# Patient Record
Sex: MALE | Race: WHITE | NOT HISPANIC OR LATINO | Employment: OTHER | URBAN - METROPOLITAN AREA
[De-identification: names, ages, dates, MRNs, and addresses within clinical notes are randomized per-mention and may not be internally consistent; named-entity substitution may affect disease eponyms.]

---

## 2017-01-03 DIAGNOSIS — I10 ESSENTIAL HYPERTENSION, BENIGN: Primary | ICD-10-CM

## 2017-01-03 NOTE — TELEPHONE ENCOUNTER
amLODIPine (NORVASC) 5 MG tablet      Last Written Prescription Date: 01/14/2016  Last Fill Quantity: 90, # refills: 3    [place] [facility] or [facility] prescribing provider:  01/14/2016 Fab[place]   Future Office Visit:    Next 5 appointments (look out 90 days)     Jan 17, 2017  8:45 AM   PHYSICAL with Sylvester Sequeira MD   [facility] ([facility])    [address] [place] [address]   [phone]                    BP Readings from Last 3 Encounters:   01/14/16 138/78   01/12/15 140/80   07/24/13 126/87       lisinopril (PRINIVIL,ZESTRIL) 20 MG tablet      Last Written Prescription Date: 01/14/2016  Last Fill Quantity: 90, # refills: 3  Last [place] [facility] or [facility] prescribing provider: 01/14/2016 Fabby   Next 5 appointments (look out 90 days)     Jan 17, 2017  8:45 AM   PHYSICAL with Sylvester Sequeira MD   [facility] ([facility])    [address] [facility] [address]   [phone]                   POTASSIUM   Date Value Ref Range Status   01/14/2016 4.3 3.4 - 5.3 mmol/L Final     CREATININE   Date Value Ref Range Status   01/14/2016 0.79 0.66 - 1.25 mg/dL Final     BP Readings from Last 3 Encounters:   01/14/16 138/78   01/12/15 140/80   07/24/13 126/87

## 2017-01-04 DIAGNOSIS — I10 ESSENTIAL HYPERTENSION, BENIGN: Primary | ICD-10-CM

## 2017-01-04 NOTE — TELEPHONE ENCOUNTER
Lisinopril 20mg tab      Last Written Prescription Date: 01/14/16  Last Fill Quantity: 90, # refills: 3  Last Office Visit with [facility] or [facility] prescribing provider: 01/14/16   Next 5 appointments (look out 90 days)     Jan 17, 2017  8:45 AM   PHYSICAL with Sylvester Sequeira MD   [facility] ([facility])    [address] [place] [address] [phone]                   POTASSIUM   Date Value Ref Range Status   01/14/2016 4.3 3.4 - 5.3 mmol/L Final     CREATININE   Date Value Ref Range Status   01/14/2016 0.79 0.66 - 1.25 mg/dL Final     BP Readings from Last 3 Encounters:   01/14/16 138/78   01/12/15 140/80   07/24/13 126/87

## 2017-01-05 RX ORDER — AMLODIPINE BESYLATE 5 MG/1
5 TABLET ORAL DAILY
Qty: 90 TABLET | Refills: 0 | Status: SHIPPED | OUTPATIENT
Start: 2017-01-05 | End: 2017-03-27

## 2017-01-05 RX ORDER — LISINOPRIL 20 MG/1
20 TABLET ORAL DAILY
Qty: 90 TABLET | Refills: 0 | Status: SHIPPED | OUTPATIENT
Start: 2017-01-05 | End: 2017-03-07

## 2017-01-05 RX ORDER — LISINOPRIL 20 MG/1
20 TABLET ORAL DAILY
Qty: 90 TABLET | Refills: 0 | Status: SHIPPED | OUTPATIENT
Start: 2017-01-05 | End: 2017-08-01

## 2017-01-05 NOTE — TELEPHONE ENCOUNTER
One fill approved since due for visit, scheduled  Prescription approved per [facility] Refill Protocol  Joanna Alcala RN BS

## 2017-01-05 NOTE — TELEPHONE ENCOUNTER
One fill authorized as is due and scheduled for appt  Prescription approved per [facility] Refill Protocol  Joanna Alcala RN BS

## 2017-01-17 ENCOUNTER — OFFICE VISIT (OUTPATIENT)
Dept: FAMILY MEDICINE | Facility: CLINIC | Age: 72
End: 2017-01-17
Payer: MEDICARE

## 2017-01-17 VITALS
HEIGHT: 67 IN | TEMPERATURE: 97.8 F | DIASTOLIC BLOOD PRESSURE: 82 MMHG | SYSTOLIC BLOOD PRESSURE: 124 MMHG | WEIGHT: 136.6 LBS | RESPIRATION RATE: 10 BRPM | BODY MASS INDEX: 21.44 KG/M2 | HEART RATE: 64 BPM

## 2017-01-17 DIAGNOSIS — Z23 NEED FOR PROPHYLACTIC VACCINATION AND INOCULATION AGAINST INFLUENZA: Primary | ICD-10-CM

## 2017-01-17 DIAGNOSIS — Z13.6 CARDIOVASCULAR SCREENING; LDL GOAL LESS THAN 130: ICD-10-CM

## 2017-01-17 DIAGNOSIS — R97.20 ELEVATED PROSTATE SPECIFIC ANTIGEN (PSA): ICD-10-CM

## 2017-01-17 DIAGNOSIS — D23.5 BENIGN NEOPLASM OF SKIN OF TRUNK, EXCEPT SCROTUM: ICD-10-CM

## 2017-01-17 LAB
ALBUMIN SERPL-MCNC: 3.9 G/DL (ref 3.4–5)
ALP SERPL-CCNC: 84 U/L (ref 40–150)
ALT SERPL W P-5'-P-CCNC: 29 U/L (ref 0–70)
ANION GAP SERPL CALCULATED.3IONS-SCNC: 5 MMOL/L (ref 3–14)
AST SERPL W P-5'-P-CCNC: 25 U/L (ref 0–45)
BILIRUB SERPL-MCNC: 0.9 MG/DL (ref 0.2–1.3)
BUN SERPL-MCNC: 14 MG/DL (ref 7–30)
CALCIUM SERPL-MCNC: 9.4 MG/DL (ref 8.5–10.1)
CHLORIDE SERPL-SCNC: 103 MMOL/L (ref 94–109)
CHOLEST SERPL-MCNC: 195 MG/DL
CO2 SERPL-SCNC: 31 MMOL/L (ref 20–32)
CREAT SERPL-MCNC: 0.89 MG/DL (ref 0.66–1.25)
GFR SERPL CREATININE-BSD FRML MDRD: 85 ML/MIN/1.7M2
GLUCOSE SERPL-MCNC: 92 MG/DL (ref 70–99)
HDLC SERPL-MCNC: 53 MG/DL
LDLC SERPL CALC-MCNC: 125 MG/DL
NONHDLC SERPL-MCNC: 142 MG/DL
POTASSIUM SERPL-SCNC: 4 MMOL/L (ref 3.4–5.3)
PROT SERPL-MCNC: 7.4 G/DL (ref 6.8–8.8)
PSA SERPL-MCNC: 12.2 UG/L (ref 0–4)
SODIUM SERPL-SCNC: 139 MMOL/L (ref 133–144)
TRIGL SERPL-MCNC: 83 MG/DL

## 2017-01-17 PROCEDURE — 84153 ASSAY OF PSA TOTAL: CPT | Performed by: FAMILY MEDICINE

## 2017-01-17 PROCEDURE — G0439 PPPS, SUBSEQ VISIT: HCPCS | Performed by: FAMILY MEDICINE

## 2017-01-17 PROCEDURE — 90662 IIV NO PRSV INCREASED AG IM: CPT | Performed by: FAMILY MEDICINE

## 2017-01-17 PROCEDURE — 36415 COLL VENOUS BLD VENIPUNCTURE: CPT | Performed by: FAMILY MEDICINE

## 2017-01-17 PROCEDURE — 80061 LIPID PANEL: CPT | Performed by: FAMILY MEDICINE

## 2017-01-17 PROCEDURE — 80053 COMPREHEN METABOLIC PANEL: CPT | Performed by: FAMILY MEDICINE

## 2017-01-17 PROCEDURE — G0008 ADMIN INFLUENZA VIRUS VAC: HCPCS | Performed by: FAMILY MEDICINE

## 2017-01-17 NOTE — PATIENT INSTRUCTIONS
Preventive Health Recommendations:   Male Ages 65 and over    Yearly exam:             See your health care provider every year in order to  o   Review health changes.   o   Discuss preventive care.    o   Review your medicines if your doctor has prescribed any.    Talk with your health care provider about whether you should have a test to screen for prostate cancer (PSA).    Every 3 years, have a diabetes test (fasting glucose). If you are at risk for diabetes, you should have this test more often.    Every 5 years, have a cholesterol test. Have this test more often if you are at risk for high cholesterol or heart disease.     Every 10 years, have a colonoscopy. Or, have a yearly FIT test (stool test). These exams will check for colon cancer.    Talk to with your health care provider about screening for Abdominal Aortic Aneurysm if you have a family history of AAA or have a history of smoking.    Shots:     Get a flu shot each year.     Get a tetanus shot every 10 years.     Talk to your doctor about your pneumonia vaccines. There are now two you should receive - Pneumovax (PPSV 23) and Prevnar (PCV 13).     Talk to your doctor about a shingles vaccine.     Talk to your doctor about the hepatitis B vaccine.  Nutrition:     Eat at least 5 servings of fruits and vegetables each day.     Eat whole-grain bread, whole-wheat pasta and brown rice instead of white grains and rice.     Talk to your provider about Calcium and Vitamin D.   Lifestyle    Exercise for at least 150 minutes a week (30 minutes a day, 5 days a week). This will help you control your weight and prevent disease.     Limit alcohol to one drink per day.     No smoking.     Wear sunscreen to prevent skin cancer.     See your dentist every six months for an exam and cleaning.     See your eye doctor every 1 to 2 years to screen for conditions such as glaucoma, macular degeneration, cataracts, etc

## 2017-01-17 NOTE — MR AVS SNAPSHOT
After Visit Summary   1/17/2017    Paul Smith    MRN: [id number]           Patient Information     Date Of Birth          1945        Visit Information        Provider Department      1/17/2017 8:45 AM Sylvester Sequeira MD [facility]        Today's Diagnoses     Benign neoplasm of skin of trunk, except scrotum    -  1     Elevated prostate specific antigen (PSA)         CARDIOVASCULAR SCREENING; LDL GOAL LESS THAN 130         Elevated prostate specific antigen (PSA)            Care Instructions      Preventive Health Recommendations:   Male Ages 65 and over    Yearly exam:             See your health care provider every year in order to  o   Review health changes.   o   Discuss preventive care.    o   Review your medicines if your doctor has prescribed any.    Talk with your health care provider about whether you should have a test to screen for prostate cancer (PSA).    Every 3 years, have a diabetes test (fasting glucose). If you are at risk for diabetes, you should have this test more often.    Every 5 years, have a cholesterol test. Have this test more often if you are at risk for high cholesterol or heart disease.     Every 10 years, have a colonoscopy. Or, have a yearly FIT test (stool test). These exams will check for colon cancer.    Talk to with your health care provider about screening for Abdominal Aortic Aneurysm if you have a family history of AAA or have a history of smoking.    Shots:     Get a flu shot each year.     Get a tetanus shot every 10 years.     Talk to your doctor about your pneumonia vaccines. There are now two you should receive - Pneumovax (PPSV 23) and Prevnar (PCV 13).     Talk to your doctor about a shingles vaccine.     Talk to your doctor about the hepatitis B vaccine.  Nutrition:     Eat at least 5 servings of fruits and vegetables each day.     Eat whole-grain bread, whole-wheat pasta and brown rice instead of white grains and rice.      Talk to your provider about Calcium and Vitamin D.   Lifestyle    Exercise for at least 150 minutes a week (30 minutes a day, 5 days a week). This will help you control your weight and prevent disease.     Limit alcohol to one drink per day.     No smoking.     Wear sunscreen to prevent skin cancer.     See your dentist every six months for an exam and cleaning.     See your eye doctor every 1 to 2 years to screen for conditions such as glaucoma, macular degeneration, cataracts, etc         Follow-ups after your visit        Additional Services     DERMATOLOGY REFERRAL       Your provider has referred you to: [facility]: [facility] [phone]   [email or web address]    Please be aware that coverage of these services is subject to the terms and limitations of your health insurance plan.  Call member services at your health plan with any benefit or coverage questions.      Please bring the following with you to your appointment:    (1) Any X-Rays, CTs or MRIs which have been performed.  Contact the facility where they were done to arrange for  prior to your scheduled appointment.    (2) List of current medications  (3) This referral request   (4) Any documents/labs given to you for this referral                  Your next 10 appointments already scheduled     Jan 25, 2017  9:30 AM   Return Visit with Chaparro Patterson MD   [facility] ([facility])    [facility] [address]  [address]  [facility] [address] [phone]              Who to contact     If you have questions or need follow up information about today's clinic visit or your schedule please contact [facility] directly at [phone].  Normal or non-critical lab and imaging results will be communicated to you by MyChart, letter or phone within 4 business days after the clinic has received the results. If you do not hear from us  "within 7 days, please contact the clinic through [employer] or phone. If you have a critical or abnormal lab result, we will notify you by phone as soon as possible.  Submit refill requests through [employer] or call your pharmacy and they will forward the refill request to us. Please allow 3 business days for your refill to be completed.          Additional Information About Your Visit        [facility] Information     [employer] lets you send messages to your doctor, view your test results, renew your prescriptions, schedule appointments and more. To sign up, go to [email or web address] . Click on \"Log in\" on the left side of the screen, which will take you to the Welcome page. Then click on \"Sign up Now\" on the right side of the page.     You will be asked to enter the access code listed below, as well as some personal information. Please follow the directions to create your username and password.     Your access code is: [email or web address]  Expires: 2017  9:14 AM     Your access code will  in 90 days. If you need help or a new code, please call your [place] clinic or [phone].        Care EveryWhere ID     This is your Care EveryWhere ID. This could be used by other organizations to access your [place] medical records  [id number]        Your Vitals Were     Pulse Temperature Respirations Height BMI (Body Mass Index)       64 97.8  F (36.6  C) (Oral) 10 5' 7.25\" (1.708 m) 21.24 kg/m2        Blood Pressure from Last 3 Encounters:   17 124/82   16 138/78   01/12/15 140/80    Weight from Last 3 Encounters:   17 136 lb 9.6 oz (61.961 kg)   16 140 lb (63.504 kg)   01/12/15 144 lb (65.318 kg)              We Performed the Following     Comprehensive metabolic panel     DERMATOLOGY REFERRAL     Lipid Profile with reflex to direct LDL     PSA tumor marker        Primary Care Provider Office Phone # Fax #    Sylvester Sequeira [phone]       [facility]" [address]  [facility] [address]        Thank you!     Thank you for choosing [facility]  for your care. Our goal is always to provide you with excellent care. Hearing back from our patients is one way we can continue to improve our services. Please take a few minutes to complete the written survey that you may receive in the mail after your visit with us. Thank you!             Your Updated Medication List - Protect others around you: Learn how to safely use, store and throw away your medicines at www.disposemymeds.org.          This list is accurate as of: 1/17/17  9:14 AM.  Always use your most recent med list.                   Brand Name Dispense Instructions for use    amLODIPine 5 MG tablet    NORVASC    90 tablet    Take 1 tablet (5 mg) by mouth daily       ASPIRIN ADULT LOW STRENGTH PO      Take 81 mg by mouth daily.       * lisinopril 20 MG tablet    PRINIVIL/ZESTRIL    90 tablet    Take 1 tablet (20 mg) by mouth daily       * lisinopril 20 MG tablet    PRINIVIL/ZESTRIL    90 tablet    Take 1 tablet (20 mg) by mouth daily       * Notice:  This list has 2 medication(s) that are the same as other medications prescribed for you. Read the directions carefully, and ask your doctor or other care provider to review them with you.

## 2017-01-17 NOTE — PROGRESS NOTES
SUBJECTIVE:                                                            Paul Smith is a 71 year old male who presents for Preventive Visit.    Are you in the first 12 months of your Medicare Part B coverage?  yes    Healthy Habits:    Do you get at least three servings of calcium containing foods daily (dairy, green leafy vegetables, etc.)? yes    Amount of exercise or daily activities, outside of work: 20 minutes a day    Problems taking medications regularly No    Medication side effects: No    Have you had an eye exam in the past two years? yes    Do you see a dentist twice per year? yes    Do you have sleep apnea, excessive snoring or daytime drowsiness?no    COGNITIVE SCREENING:  Not appropriate due to known dementia            All Histories reviewed and updated in EPIC as appropriate.  Social History   Substance Use Topics     Smoking status: Never Smoker      Smokeless tobacco: Never Used     Alcohol Use: Yes      Comment: beer or wine socially occasionally       none    Today's PHQ-2 Score:   PHQ-2 ( 1999 [employer]) 1/14/2016 1/12/2015   Q1: Little interest or pleasure in doing things 0 0   Q2: Feeling down, depressed or hopeless 0 0   PHQ-2 Score 0 0       Do you feel safe in your environment - Yes    Do you have a Health Care Directive?: Yes: Patient states has Advance Directive and will bring in a copy to clinic.  HE WILL BE BRINGING IN HIS HEALTH CARE DIRECTIVE   Current providers sharing in care for this patient include:   Patient Care Team:  Sylvester Sequeira MD as PCP - General (Family Practice)      Hearing impairment:     Ability to successfully perform activities of daily living:      Fall risk:      Home safety:  none identified  click delete button to remove this line now    The following health maintenance items are reviewed in Epic and correct as of today:  Health Maintenance   Topic Date Due     ADVANCE DIRECTIVE PLANNING Q5 YRS (NO INBASKET)  06/20/1963     HEPATITIS C SCREENING   "06/20/1963     AORTIC ANEURYSM SCREENING (SYSTEM ASSIGNED)  06/20/2011     INFLUENZA VACCINE (SYSTEM ASSIGNED)  09/01/2016     CREATININE Q1 YEAR (NO INBASKET)  01/14/2017     MEDICARE ANNUAL WELLNESS VISIT  01/14/2017     FALL RISK ASSESSMENT  01/14/2017     LIPID SCREEN Q5 YR MALE (SYSTEM ASSIGNED)  01/14/2021     TETANUS IMMUNIZATION (SYSTEM ASSIGNED)  02/01/2021     COLON CANCER SCREEN (SYSTEM ASSIGNED)  07/24/2023     PNEUMOCOCCAL  Completed         Pneumonia Vaccine:Adults age 65+ who received Pneumovax (PPSV23) at 65 years or older: Should be given PCV13 > 1 year after their most recent PPSV23     ROS:  Constitutional, HEENT, cardiovascular, pulmonary, GI, , musculoskeletal, neuro, skin, endocrine and psych systems are negative, except as otherwise noted.    BP Readings from Last 3 Encounters:   01/17/17 124/82   01/14/16 138/78   01/12/15 140/80    Wt Readings from Last 3 Encounters:   01/17/17 136 lb 9.6 oz (61.961 kg)   01/14/16 140 lb (63.504 kg)   01/12/15 144 lb (65.318 kg)                  OBJECTIVE:                                                            There were no vitals taken for this visit. Estimated body mass index is 21.59 kg/(m^2) as calculated from the following:    Height as of 1/14/16: 5' 7.5\" (1.715 m).    Weight as of 1/14/16: 140 lb (63.504 kg).  EXAM:   GENERAL: healthy, alert and no distress  EYES: Eyes grossly normal to inspection, PERRL and conjunctivae and sclerae normal  HENT: ear canals and TM's normal, nose and mouth without ulcers or lesions  NECK: no adenopathy, no asymmetry, masses, or scars and thyroid normal to palpation  RESP: lungs clear to auscultation - no rales, rhonchi or wheezes  CV: regular rate and rhythm, normal S1 S2, no S3 or S4, no murmur, click or rub, no peripheral edema and peripheral pulses strong  ABDOMEN: soft, nontender, no hepatosplenomegaly, no masses and bowel sounds normal   (male): normal male genitalia without lesions or urethral discharge, " "no hernia  RECTAL: normal sphincter tone, no rectal masses, prostate normal size, smooth, nontender without nodules or masses  MS: no gross musculoskeletal defects noted, no edema  SKIN: no suspicious lesions or rashes  NEURO: Normal strength and tone, mentation intact and speech normal  PSYCH: mentation appears normal, affect normal/bright    ASSESSMENT / PLAN:                                                            There are no diagnoses linked to this encounter.    End of Life Planning:  Patient currently has an advanced directive: Yes.  Practitioner is supportive of decision.    COUNSELING:          Estimated body mass index is 21.59 kg/(m^2) as calculated from the following:    Height as of 1/14/16: 5' 7.5\" (1.715 m).    Weight as of 1/14/16: 140 lb (63.504 kg).     reports that he has never smoked. He has never used smokeless tobacco.  He is a never smoker    Appropriate preventive services were discussed with this patient, including applicable screening as appropriate for cardiovascular disease, diabetes, osteopenia/osteoporosis, and glaucoma.  As appropriate for age/gender, discussed screening for colorectal cancer, prostate cancer, breast cancer, and cervical cancer. Checklist reviewing preventive services available has been given to the patient.    Reviewed patients plan of care and provided an AVS. The Basic Care Plan (routine screening as documented in Health Maintenance) for Paul meets the Care Plan requirement. This Care Plan has been established and reviewed with the Patient.    Counseling Resources:  ATP IV Guidelines  Pooled Cohorts Equation Calculator  Breast Cancer Risk Calculator  FRAX Risk Assessment  ICSI Preventive Guidelines  Dietary Guidelines for Americans, 2010  USDA's MyPlate  ASA Prophylaxis  Lung CA Screening    Sylvester Sequeira MD  [facility]"

## 2017-01-17 NOTE — Clinical Note
[facility]  [address] [place], [address]  [phone]      January 23, 2017    Paul Smith  [address] [facility] [address]          Dear Paul,    The results of your recent tests are back.Psa is the same as last year. Cholesterol is pretty good,. NIce to see you Blood sugar , liver and kidneys' are good.   Enclosed is a copy of the results.  It was a pleasure to see you at your last appointment.  Results for orders placed or performed in visit on 01/17/17   PSA tumor marker   Result Value Ref Range    PSA 12.20 (H) 0 - 4 ug/L   Lipid Profile with reflex to direct LDL   Result Value Ref Range    Cholesterol 195 <200 mg/dL    Triglycerides 83 <150 mg/dL    HDL Cholesterol 53 >39 mg/dL    LDL Cholesterol Calculated 125 (H) <100 mg/dL    Non HDL Cholesterol 142 (H) <130 mg/dL   Comprehensive metabolic panel   Result Value Ref Range    Sodium 139 133 - 144 mmol/L    Potassium 4.0 3.4 - 5.3 mmol/L    Chloride 103 94 - 109 mmol/L    Carbon Dioxide 31 20 - 32 mmol/L    Anion Gap 5 3 - 14 mmol/L    Glucose 92 70 - 99 mg/dL    Urea Nitrogen 14 7 - 30 mg/dL    Creatinine 0.89 0.66 - 1.25 mg/dL    GFR Estimate 85 >60 mL/min/1.7m2    GFR Estimate If Black >90   GFR Calc   >60 mL/min/1.7m2    Calcium 9.4 8.5 - 10.1 mg/dL    Bilirubin Total 0.9 0.2 - 1.3 mg/dL    Albumin 3.9 3.4 - 5.0 g/dL    Protein Total 7.4 6.8 - 8.8 g/dL    Alkaline Phosphatase 84 40 - 150 U/L    ALT 29 0 - 70 U/L    AST 25 0 - 45 U/L       If you have any questions or concerns, please call myself or my nurse at [phone].          Sincerely,    Sylvester Sequeira MD

## 2017-01-17 NOTE — NURSING NOTE
"Chief Complaint   Patient presents with     Physical       Initial /82 mmHg  Pulse 64  Temp(Src) 97.8  F (36.6  C) (Oral)  Resp 10  Ht 5' 7.25\" (1.708 m)  Wt 136 lb 9.6 oz (61.961 kg)  BMI 21.24 kg/m2 Estimated body mass index is 21.24 kg/(m^2) as calculated from the following:    Height as of this encounter: 5' 7.25\" (1.708 m).    Weight as of this encounter: 136 lb 9.6 oz (61.961 kg).  BP completed using cuff size: domitila Colvin CMA       "

## 2017-01-17 NOTE — PROGRESS NOTES
Injectable Influenza Immunization Documentation    1.  Is the person to be vaccinated sick today?  No    2. Does the person to be vaccinated have an allergy to eggs or to a component of the vaccine?  No    3. Has the person to be vaccinated today ever had a serious reaction to influenza vaccine in the past?  No    4. Has the person to be vaccinated ever had Guillain-Odd syndrome?  No     Form completed by Jalen Colvin CMA

## 2017-01-25 ENCOUNTER — OFFICE VISIT (OUTPATIENT)
Dept: UROLOGY | Facility: CLINIC | Age: 72
End: 2017-01-25
Payer: MEDICARE

## 2017-01-25 VITALS — BODY MASS INDEX: 21.77 KG/M2 | WEIGHT: 140 LBS | HEART RATE: 64 BPM | OXYGEN SATURATION: 98 %

## 2017-01-25 DIAGNOSIS — N40.0 ENLARGED PROSTATE: ICD-10-CM

## 2017-01-25 DIAGNOSIS — R97.20 ELEVATED PSA: Primary | ICD-10-CM

## 2017-01-25 PROCEDURE — 99213 OFFICE O/P EST LOW 20 MIN: CPT | Performed by: UROLOGY

## 2017-01-25 ASSESSMENT — PAIN SCALES - GENERAL: PAINLEVEL: NO PAIN (0)

## 2017-01-25 NOTE — PROGRESS NOTES
Office Visit Note  [facility]  [phone]    UROLOGIC DIAGNOSES:   Elevated PSA and enlarged prostate    CURRENT INTERVENTIONS:   Negative prostate biopsies  ×2 and negative MRI    HISTORY:   This is a 71-year-old gentleman who I had been following for an elevated PSA.  He has had 2 previous prostate biopsies that were negative, most recently in 2012 when his PSA was 10.7. His prostate is enlarged at 75 cm .  In early 2015 he had a very high PSA of 37 and at that time history with ciprofloxacin and the PSA improved back down to 14 and then eventually at 8.8. Most recently he had a PSA of 12.2 earlier this month. He does complain of a somewhat slow urinary stream at night but overall has no major urinary complaints.      PAST MEDICAL HISTORY:   Past Medical History   Diagnosis Date     Hypertension      Enlarged prostate      per pt reprt     PSA elevation        PAST SURGICAL HISTORY:   Past Surgical History   Procedure Laterality Date     Colonoscopy  7/24/2013     Dr. Craig [facility]     Colonoscopy  7/24/2013     Procedure: COMBINED COLONOSCOPY, SINGLE BIOPSY/POLYPECTOMY BY BIOPSY;  Colonoscopy with polypectomy using cold forceps.;  Surgeon: Zach Craig MD;  Location:  GI       FAMILY HISTORY:   Family History   Problem Relation Age of Onset     Hypertension Mother      Alzheimer Disease Mother      dementia     CANCER Father 64     lymphoma cancer     HEART DISEASE Father      triple by-pass       SOCIAL HISTORY:   Social History   Substance Use Topics     Smoking status: Never Smoker      Smokeless tobacco: Never Used     Alcohol Use: Yes      Comment: beer or wine socially occasionally       Current Outpatient Prescriptions   Medication     amLODIPine (NORVASC) 5 MG tablet     lisinopril (PRINIVIL/ZESTRIL) 20 MG tablet     ASPIRIN ADULT LOW STRENGTH PO     lisinopril (PRINIVIL/ZESTRIL) 20 MG tablet     No current facility-administered medications for this visit.         PHYSICAL EXAM:    There  were no vitals taken for this visit.    HEENT: Normocephalic and atraumatic   Cardiac: Not done  Back/Flank: Not done  CNS/PNS: Not done  Respiratory: Normal non-labored breathing  Abdomen: Soft nontender and nondistended  Peripheral Vascular: Not done  Mental Status: Not done    Penis: Not done  Scrotal Skin: Not done  Testicles: Not done  Epididymis: Not done  Digital Rectal Exam: his prostate is very enlarged but benign and symmetric to palpation    Cystoscopy: Not done    Imaging: None    Urinalysis: UA RESULTS:  Recent Labs   Lab Test  02/02/10   0811   COLOR  Yellow   APPEARANCE  Clear   URINEGLC  Negative   URINEBILI  Negative   URINEKETONE  Negative   SG  1.020   UBLD  Negative   URINEPH  7.5*   PROTEIN  Negative   UROBILINOGEN  0.2   NITRITE  Negative   LEUKEST  Negative       PSA: 12.2    Post Void Residual: 89mL    Other labs: None today      IMPRESSION:  Elevated PSA, enlarged prostate    PLAN:  His PSA elevated but it also remains in the same range where it has been for the past 5 years.  I recommended that we continue to follow this.  I will see him back again in 1 year for another PSA and digital rectal examination.    Total Time: 15 minutes                                      Total in Consultation: 15 minutes      Chaparro Patterson M.D.

## 2017-01-25 NOTE — Clinical Note
1/25/2017      RE: Paul Smith  [address] [facility] [address]       Office Visit Note  [facility]  [phone]    UROLOGIC DIAGNOSES:   Elevated PSA and enlarged prostate    CURRENT INTERVENTIONS:   Negative prostate biopsies  ×2 and negative MRI    HISTORY:   This is a 71-year-old gentleman who I had been following for an elevated PSA.  He has had 2 previous prostate biopsies that were negative, most recently in 2012 when his PSA was 10.7. His prostate is enlarged at 75 cm .  In early 2015 he had a very high PSA of 37 and at that time history with ciprofloxacin and the PSA improved back down to 14 and then eventually at 8.8. Most recently he had a PSA of 12.2 earlier this month. He does complain of a somewhat slow urinary stream at night but overall has no major urinary complaints.      PAST MEDICAL HISTORY:   Past Medical History   Diagnosis Date     Hypertension      Enlarged prostate      per pt reprt     PSA elevation        PAST SURGICAL HISTORY:   Past Surgical History   Procedure Laterality Date     Colonoscopy  7/24/2013     Dr. Craig [facility]     Colonoscopy  7/24/2013     Procedure: COMBINED COLONOSCOPY, SINGLE BIOPSY/POLYPECTOMY BY BIOPSY;  Colonoscopy with polypectomy using cold forceps.;  Surgeon: Zach Craig MD;  Location:  GI       FAMILY HISTORY:   Family History   Problem Relation Age of Onset     Hypertension Mother      Alzheimer Disease Mother      dementia     CANCER Father 64     lymphoma cancer     HEART DISEASE Father      triple by-pass       SOCIAL HISTORY:   Social History   Substance Use Topics     Smoking status: Never Smoker      Smokeless tobacco: Never Used     Alcohol Use: Yes      Comment: beer or wine socially occasionally       Current Outpatient Prescriptions   Medication     amLODIPine (NORVASC) 5 MG tablet     lisinopril (PRINIVIL/ZESTRIL) 20 MG tablet     ASPIRIN ADULT LOW STRENGTH PO     lisinopril (PRINIVIL/ZESTRIL) 20 MG tablet     No current  facility-administered medications for this visit.         PHYSICAL EXAM:    There were no vitals taken for this visit.    HEENT: Normocephalic and atraumatic   Cardiac: Not done  Back/Flank: Not done  CNS/PNS: Not done  Respiratory: Normal non-labored breathing  Abdomen: Soft nontender and nondistended  Peripheral Vascular: Not done  Mental Status: Not done    Penis: Not done  Scrotal Skin: Not done  Testicles: Not done  Epididymis: Not done  Digital Rectal Exam: his prostate is very enlarged but benign and symmetric to palpation    Cystoscopy: Not done    Imaging: None    Urinalysis: UA RESULTS:  Recent Labs   Lab Test  02/02/10   0811   COLOR  Yellow   APPEARANCE  Clear   URINEGLC  Negative   URINEBILI  Negative   URINEKETONE  Negative   SG  1.020   UBLD  Negative   URINEPH  7.5*   PROTEIN  Negative   UROBILINOGEN  0.2   NITRITE  Negative   LEUKEST  Negative       PSA: 12.2    Post Void Residual: 89mL    Other labs: None today      IMPRESSION:  Elevated PSA, enlarged prostate    PLAN:  His PSA elevated but it also remains in the same range where it has been for the past 5 years.  I recommended that we continue to follow this.  I will see him back again in 1 year for another PSA and digital rectal examination.    Total Time: 15 minutes                                      Total in Consultation: 15 minutes      Chaparro Patterson M.D.

## 2017-03-07 DIAGNOSIS — I10 ESSENTIAL HYPERTENSION, BENIGN: ICD-10-CM

## 2017-03-08 NOTE — TELEPHONE ENCOUNTER
Lisinopril 20 mg tab       Last Written Prescription Date: 01/05/17  Last Fill Quantity: 90, # refills: 3  Last Office Visit with [facility], [facility] or [facility] prescribing provider: 1/17/17 Dr. Sequeira        Potassium   Date Value Ref Range Status   01/17/2017 4.0 3.4 - 5.3 mmol/L Final     Creatinine   Date Value Ref Range Status   01/17/2017 0.89 0.66 - 1.25 mg/dL Final     BP Readings from Last 3 Encounters:   01/17/17 124/82   01/14/16 138/78   01/12/15 140/80

## 2017-03-13 RX ORDER — LISINOPRIL 20 MG/1
TABLET ORAL
Qty: 90 TABLET | Refills: 3 | Status: SHIPPED | OUTPATIENT
Start: 2017-03-13 | End: 2017-08-01

## 2017-03-27 DIAGNOSIS — I10 ESSENTIAL HYPERTENSION, BENIGN: ICD-10-CM

## 2017-03-27 NOTE — TELEPHONE ENCOUNTER
Amlodipine 5mg   - Patient would like a year supply please  Last Written Prescription Date: 01/17/2017  Last Fill Quantity: 90, # refills: 0    Last Office Visit with [facility], [facility] or [facility] prescribing provider:  01/17/2017-Dr Sequeira.   Future Office Visit:        BP Readings from Last 3 Encounters:   01/17/17 124/82   01/14/16 138/78   01/12/15 140/80

## 2017-03-29 RX ORDER — AMLODIPINE BESYLATE 5 MG/1
5 TABLET ORAL DAILY
Qty: 90 TABLET | Refills: 2 | Status: SHIPPED | OUTPATIENT
Start: 2017-03-29 | End: 2017-04-03

## 2017-03-29 NOTE — TELEPHONE ENCOUNTER
Potassium   Date Value Ref Range Status   01/17/2017 4.0 3.4 - 5.3 mmol/L Final     Creatinine   Date Value Ref Range Status   01/17/2017 0.89 0.66 - 1.25 mg/dL Final     BP Readings from Last 3 Encounters:   01/17/17 124/82   01/14/16 138/78   01/12/15 140/80     Routing refill request to provider for review/approval because:  OK to fill for a year?    Joanna Alcala RN, BS  Clinical Nurse Triage.

## 2017-04-03 RX ORDER — AMLODIPINE BESYLATE 5 MG/1
5 TABLET ORAL DAILY
Qty: 90 TABLET | Refills: 2 | Status: SHIPPED | OUTPATIENT
Start: 2017-04-03 | End: 2017-08-01

## 2017-04-06 ENCOUNTER — TRANSFERRED RECORDS (OUTPATIENT)
Dept: HEALTH INFORMATION MANAGEMENT | Facility: CLINIC | Age: 72
End: 2017-04-06

## 2017-07-22 ENCOUNTER — DOCUMENTATION ONLY (OUTPATIENT)
Dept: OTHER | Facility: CLINIC | Age: 72
End: 2017-07-22

## 2017-07-22 DIAGNOSIS — Z71.89 ACP (ADVANCE CARE PLANNING): Chronic | ICD-10-CM

## 2017-07-27 DIAGNOSIS — I10 ESSENTIAL HYPERTENSION, BENIGN: ICD-10-CM

## 2017-07-27 NOTE — TELEPHONE ENCOUNTER
3 month Supply with 2 RF sent 4/3/17.    Fax sent to pharm informing above.  Please disregard request.    FRANCOISE Ramirez  July 27, 2017  11:51 AM

## 2017-07-27 NOTE — TELEPHONE ENCOUNTER
AMLODIPINE TAB 5MG        Last Written Prescription Date: 04/03/17  Last Fill Quantity: 90, # refills: 2    Last Office Visit with [facility], [facility] or [facility] prescribing provider:  01/17/17 Dr. Sequeira   Future Office Visit:        BP Readings from Last 3 Encounters:   01/17/17 124/82   01/14/16 138/78   01/12/15 140/80

## 2017-08-01 DIAGNOSIS — I10 ESSENTIAL HYPERTENSION, BENIGN: ICD-10-CM

## 2017-08-01 RX ORDER — AMLODIPINE BESYLATE 5 MG/1
5 TABLET ORAL DAILY
Qty: 90 TABLET | Refills: 1 | Status: SHIPPED | OUTPATIENT
Start: 2017-08-01 | End: 2018-01-18

## 2017-08-01 RX ORDER — LISINOPRIL 20 MG/1
20 TABLET ORAL DAILY
Qty: 90 TABLET | Refills: 1 | Status: SHIPPED | OUTPATIENT
Start: 2017-08-01 | End: 2018-01-18

## 2017-08-01 RX ORDER — AMLODIPINE BESYLATE 5 MG/1
TABLET ORAL
Status: SHIPPED
Start: 2017-08-01 | End: 2019-01-21

## 2017-08-01 NOTE — TELEPHONE ENCOUNTER
9 month supply sent 4/3/17.  Should have enough til 12/3/17.  Receipt confirmed by pharmacy (4/3/2017  9:18 AM CDT)    Sneha Retana RN

## 2017-08-01 NOTE — TELEPHONE ENCOUNTER
BP Readings from Last 2 Encounters:   01/17/17 124/82   01/14/16 138/78     Lab Results   Component Value Date    CR 0.89 01/17/2017     Lab Results   Component Value Date    POTASSIUM 4.0 01/17/2017     RESENT per pt request, pt walked in  Prescription approved per [facility] Refill Protocol.  Adina Pandey RN, BSN

## 2018-01-18 ENCOUNTER — OFFICE VISIT (OUTPATIENT)
Dept: FAMILY MEDICINE | Facility: CLINIC | Age: 73
End: 2018-01-18
Payer: MEDICARE

## 2018-01-18 VITALS
DIASTOLIC BLOOD PRESSURE: 84 MMHG | BODY MASS INDEX: 20.88 KG/M2 | WEIGHT: 133 LBS | TEMPERATURE: 97.7 F | SYSTOLIC BLOOD PRESSURE: 122 MMHG | HEART RATE: 63 BPM | OXYGEN SATURATION: 99 % | HEIGHT: 67 IN | RESPIRATION RATE: 12 BRPM

## 2018-01-18 DIAGNOSIS — Z11.59 ENCOUNTER FOR HEPATITIS C SCREENING TEST FOR LOW RISK PATIENT: ICD-10-CM

## 2018-01-18 DIAGNOSIS — I10 ESSENTIAL HYPERTENSION, BENIGN: ICD-10-CM

## 2018-01-18 DIAGNOSIS — L72.3 SEBACEOUS CYST: ICD-10-CM

## 2018-01-18 DIAGNOSIS — L57.0 AK (ACTINIC KERATOSIS): ICD-10-CM

## 2018-01-18 DIAGNOSIS — Z00.00 MEDICARE ANNUAL WELLNESS VISIT, SUBSEQUENT: Primary | ICD-10-CM

## 2018-01-18 DIAGNOSIS — Z23 NEED FOR PROPHYLACTIC VACCINATION AND INOCULATION AGAINST INFLUENZA: ICD-10-CM

## 2018-01-18 DIAGNOSIS — H35.9 RETINA DISORDER, RIGHT: ICD-10-CM

## 2018-01-18 DIAGNOSIS — I10 HYPERTENSION, GOAL BELOW 140/90: ICD-10-CM

## 2018-01-18 DIAGNOSIS — R97.20 ELEVATED PROSTATE SPECIFIC ANTIGEN (PSA): ICD-10-CM

## 2018-01-18 LAB — PSA SERPL-MCNC: 11.7 UG/L (ref 0–4)

## 2018-01-18 PROCEDURE — 80061 LIPID PANEL: CPT | Performed by: FAMILY MEDICINE

## 2018-01-18 PROCEDURE — 80053 COMPREHEN METABOLIC PANEL: CPT | Performed by: FAMILY MEDICINE

## 2018-01-18 PROCEDURE — 36415 COLL VENOUS BLD VENIPUNCTURE: CPT | Performed by: FAMILY MEDICINE

## 2018-01-18 PROCEDURE — 90662 IIV NO PRSV INCREASED AG IM: CPT | Performed by: FAMILY MEDICINE

## 2018-01-18 PROCEDURE — G0439 PPPS, SUBSEQ VISIT: HCPCS | Performed by: FAMILY MEDICINE

## 2018-01-18 PROCEDURE — G0472 HEP C SCREEN HIGH RISK/OTHER: HCPCS | Performed by: FAMILY MEDICINE

## 2018-01-18 PROCEDURE — 84153 ASSAY OF PSA TOTAL: CPT | Performed by: FAMILY MEDICINE

## 2018-01-18 PROCEDURE — G0008 ADMIN INFLUENZA VIRUS VAC: HCPCS | Performed by: FAMILY MEDICINE

## 2018-01-18 RX ORDER — LISINOPRIL 20 MG/1
20 TABLET ORAL DAILY
Qty: 90 TABLET | Refills: 3 | Status: SHIPPED | OUTPATIENT
Start: 2018-01-18 | End: 2019-01-21

## 2018-01-18 RX ORDER — AMLODIPINE BESYLATE 5 MG/1
5 TABLET ORAL DAILY
Qty: 90 TABLET | Refills: 3 | Status: SHIPPED | OUTPATIENT
Start: 2018-01-18 | End: 2019-01-21

## 2018-01-18 ASSESSMENT — ACTIVITIES OF DAILY LIVING (ADL)
CURRENT_FUNCTION: NO ASSISTANCE NEEDED
I_NEED_ASSISTANCE_FOR_THE_FOLLOWING_DAILY_ACTIVITIES:: NO ASSISTANCE IS NEEDED

## 2018-01-18 NOTE — PROGRESS NOTES

## 2018-01-18 NOTE — PROGRESS NOTES
SUBJECTIVE:   Paul Smith is a 72 year old male who presents for Preventive Visit.      Are you in the first 12 months of your Medicare coverage?  No    Physical   Annual:     Getting at least 3 servings of Calcium per day::  Yes    Bi-annual eye exam::  Yes    Dental care twice a year::  Yes    Sleep apnea or symptoms of sleep apnea::  None    Diet::  Regular (no restrictions)    Frequency of exercise::  6-7 days/week    Duration of exercise::  15-30 minutes    Taking medications regularly::  Yes    Medication side effects::  None    Additional concerns today::  No    Ability to successfully perform activities of daily living: no assistance needed  Home Safety:  Lack of grab bars in the bathroom  Hearing Impairment: no hearing concerns        Fall risk:         COGNITIVE SCREEN  1) Repeat 3 items (Banana, Sunrise, Chair)    2) Clock draw: NORMAL  3) 3 item recall: Recalls 1 object   Results: NORMAL clock, 1-2 items recalled: COGNITIVE IMPAIRMENT LESS LIKELY    Mini-CogTM Copyright S Cristian. Licensed by the author for use in [place]; reprinted with permission (tim@[facility]). All rights reserved.    Past Medical History:   Diagnosis Date     Enlarged prostate     per pt reprt     Hypertension      PSA elevation        Past Surgical History:   Procedure Laterality Date     COLONOSCOPY  7/24/2013    Dr. Craig [facility]     COLONOSCOPY  7/24/2013    Procedure: COMBINED COLONOSCOPY, SINGLE BIOPSY/POLYPECTOMY BY BIOPSY;  Colonoscopy with polypectomy using cold forceps.;  Surgeon: Zach Craig MD;  Location:  GI       Family History   Problem Relation Age of Onset     Hypertension Mother      Alzheimer Disease Mother      dementia     CANCER Father 64     lymphoma cancer     HEART DISEASE Father      triple by-pass       Social History   Substance Use Topics     Smoking status: Never Smoker     Smokeless tobacco: Never Used     Alcohol use Yes      Comment: beer or wine socially occasionally                  Social History   Substance Use Topics     Smoking status: Never Smoker     Smokeless tobacco: Never Used     Alcohol use Yes      Comment: beer or wine socially occasionally       Alcohol Use 1/18/2018   If you drink alcohol, do you typically have greater than 3 drinks per day OR greater than 7 drinks per week?   Not applicable                 Today's PHQ-2 Score: PHQ-2 ( 1999 [employer]) 1/18/2018   Q1: Little interest or pleasure in doing things 0   Q2: Feeling down, depressed or hopeless 0   PHQ-2 Score 0   Q1: Little interest or pleasure in doing things Not at all   Q2: Feeling down, depressed or hopeless Not at all   PHQ-2 Score 0       Do you feel safe in your environment - Yes    Do you have a Health Care Directive?: Yes: Patient states has Advance Directive and will bring in a copy to clinic.    Current providers sharing in care for this patient include:   Patient Care Team:  Sylvester Sequeira MD as PCP - General (Family Practice)    The following health maintenance items are reviewed in Epic and correct as of today:  Health Maintenance   Topic Date Due     HEPATITIS C SCREENING  06/20/1963     AORTIC ANEURYSM SCREENING (SYSTEM ASSIGNED)  06/20/2011     INFLUENZA VACCINE (SYSTEM ASSIGNED)  09/01/2017     CREATININE Q1 YEAR  01/17/2018     MEDICARE ANNUAL WELLNESS VISIT  01/17/2018     FALL RISK ASSESSMENT  01/17/2018     TETANUS IMMUNIZATION (SYSTEM ASSIGNED)  02/01/2021     LIPID SCREEN Q5 YR MALE (SYSTEM ASSIGNED)  01/17/2022     ADVANCE DIRECTIVE PLANNING Q5 YRS  07/22/2022     COLON CANCER SCREEN (SYSTEM ASSIGNED)  07/24/2023     PNEUMOCOCCAL  Completed     BP Readings from Last 3 Encounters:   01/18/18 122/84   01/17/17 124/82   01/14/16 138/78    Wt Readings from Last 3 Encounters:   01/18/18 133 lb (60.3 kg)   01/25/17 140 lb (63.5 kg)   01/17/17 136 lb 9.6 oz (62 kg)                      Pneumonia Vaccine:Adults age 65+ who received Pneumovax (PPSV23) at 65 years or older: Should be given PCV13 >  "1 year after their most recent PPSV23  Review of Systems  Constitutional, HEENT, cardiovascular, pulmonary, GI, , musculoskeletal, neuro, skin, endocrine and psych systems are negative, except as otherwise noted.      OBJECTIVE:   There were no vitals taken for this visit. Estimated body mass index is 21.76 kg/(m^2) as calculated from the following:    Height as of 1/17/17: 5' 7.25\" (1.708 m).    Weight as of 1/25/17: 140 lb (63.5 kg).  Physical Exam  GENERAL: healthy, alert and no distress  EYES: Eyes grossly normal to inspection, PERRL and conjunctivae and sclerae normal  HENT: ear canals and TM's normal, nose and mouth without ulcers or lesions  NECK: no adenopathy, no asymmetry, masses, or scars and thyroid normal to palpation  RESP: lungs clear to auscultation - no rales, rhonchi or wheezes  CV: regular rate and rhythm, normal S1 S2, no S3 or S4, no murmur, click or rub, no peripheral edema and peripheral pulses strong  ABDOMEN: soft, nontender, no hepatosplenomegaly, no masses and bowel sounds normal   (male): normal male genitalia without lesions or urethral discharge, no hernia  MS: no gross musculoskeletal defects noted, no edema  SKIN: no suspicious lesions or rashes  NEURO: Normal strength and tone, mentation intact and speech normal  PSYCH: mentation appears normal, affect normal/bright    ASSESSMENT / PLAN:   (Z00.00) Medicare annual wellness visit, subsequent  (primary encounter diagnosis)  Comment:   Plan:     (Z00.00) Medicare annual wellness visit, subsequent  (primary encounter diagnosis)  Comment: feeling well, rigorous dailyl PT workout keeps him lean  Plan:     (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  Practitioner is supportive of decision.    COUNSELING:  Reviewed preventive health counseling, as reflected in patient instructions       Regular exercise       Healthy diet/nutrition       Vision screening        Estimated body mass index is 21.76 kg/(m^2) as calculated from the following:    Height as of 1/17/17: 5' 7.25\" (1.708 m).    Weight as of 1/25/17: 140 lb (63.5 kg).     reports that he has never smoked. He has never used smokeless tobacco.        Appropriate preventive services were discussed with this patient, including applicable screening as appropriate for cardiovascular disease, diabetes, osteopenia/osteoporosis, and glaucoma.  As appropriate for age/gender, discussed screening for colorectal cancer, prostate cancer, breast cancer, and cervical cancer. Checklist reviewing preventive services available has been given to the patient.    Reviewed patients plan of care and provided an AVS. The Basic Care Plan (routine screening as documented in Health Maintenance) for Paul meets the Care Plan requirement. This Care Plan has been established and reviewed with the Patient.    Counseling Resources:  ATP IV Guidelines  Pooled Cohorts Equation Calculator  Breast Cancer Risk Calculator  FRAX Risk Assessment  ICSI Preventive Guidelines  Dietary Guidelines for Americans, 2010  [employer]'s MyPlate  ASA Prophylaxis  Lung CA Screening    Sylvester Sequeira MD  [facility] for HPI/ROS submitted by the patient on 1/18/2018   PHQ-2 Score: 0    "

## 2018-01-18 NOTE — MR AVS SNAPSHOT
After Visit Summary   1/18/2018    Paul Smith    MRN: [id number]           Patient Information     Date Of Birth          1945        Visit Information        Provider Department      1/18/2018 8:45 AM Sylvester Sequeira MD [facility]        Today's Diagnoses     Medicare annual wellness visit, subsequent    -  1    Retina disorder, right        Elevated prostate specific antigen (PSA)        AK (actinic keratosis)        Sebaceous cyst        Hypertension, goal below 140/90        Encounter for hepatitis C screening test for low risk patient        BENIGN HYPERTENSION          Care Instructions      Preventive Health Recommendations:   Male Ages 65 and over    Yearly exam:             See your health care provider every year in order to  o   Review health changes.   o   Discuss preventive care.    o   Review your medicines if your doctor has prescribed any.    Talk with your health care provider about whether you should have a test to screen for prostate cancer (PSA).    Every 3 years, have a diabetes test (fasting glucose). If you are at risk for diabetes, you should have this test more often.    Every 5 years, have a cholesterol test. Have this test more often if you are at risk for high cholesterol or heart disease.     Every 10 years, have a colonoscopy. Or, have a yearly FIT test (stool test). These exams will check for colon cancer.    Talk to with your health care provider about screening for Abdominal Aortic Aneurysm if you have a family history of AAA or have a history of smoking.    Shots:     Get a flu shot each year.     Get a tetanus shot every 10 years.     Talk to your doctor about your pneumonia vaccines. There are now two you should receive - Pneumovax (PPSV 23) and Prevnar (PCV 13).     Talk to your doctor about a shingles vaccine.     Talk to your doctor about the hepatitis B vaccine.  Nutrition:     Eat at least 5 servings of fruits and vegetables  each day.     Eat whole-grain bread, whole-wheat pasta and brown rice instead of white grains and rice.     Talk to your provider about Calcium and Vitamin D.   Lifestyle    Exercise for at least 150 minutes a week (30 minutes a day, 5 days a week). This will help you control your weight and prevent disease.     Limit alcohol to one drink per day.     No smoking.     Wear sunscreen to prevent skin cancer.     See your dentist every six months for an exam and cleaning.     See your eye doctor every 1 to 2 years to screen for conditions such as glaucoma, macular degeneration, cataracts, etc           Follow-ups after your visit        Your next 10 appointments already scheduled     Jan 31, 2018  8:45 AM CST   Return Visit with Chaparro Patterson MD   [facility] ([facility])    [address]  [address]  [facility] [address] [phone]              Who to contact     If you have questions or need follow up information about today's clinic visit or your schedule please contact [facility] directly at [phone].  Normal or non-critical lab and imaging results will be communicated to you by TurboHeadshart, letter or phone within 4 business days after the clinic has received the results. If you do not hear from us within 7 days, please contact the clinic through [employer] or phone. If you have a critical or abnormal lab result, we will notify you by phone as soon as possible.  Submit refill requests through [employer] or call your pharmacy and they will forward the refill request to us. Please allow 3 business days for your refill to be completed.          Additional Information About Your Visit        [employer] Information     [employer] gives you secure access to your electronic health record. If you see a primary care provider, you can also send messages to your care team and make appointments. If you have questions, please call your  "primary care clinic.  If you do not have a primary care provider, please call [phone] and they will assist you.        Care EveryWhere ID     This is your Care EveryWhere ID. This could be used by other organizations to access your [place] medical records  [id number]        Your Vitals Were     Pulse Temperature Respirations Height Pulse Oximetry BMI (Body Mass Index)    63 97.7  F (36.5  C) (Oral) 12 5' 7\" (1.702 m) 99% 20.83 kg/m2       Blood Pressure from Last 3 Encounters:   01/18/18 122/84   01/17/17 124/82   01/14/16 138/78    Weight from Last 3 Encounters:   01/18/18 133 lb (60.3 kg)   01/25/17 140 lb (63.5 kg)   01/17/17 136 lb 9.6 oz (62 kg)              We Performed the Following     Comprehensive metabolic panel     Hepatitis C Screen Reflex to HCV RNA Quant and Genotype     Lipid panel reflex to direct LDL Fasting     PSA, tumor marker          Where to get your medicines      These medications were sent to [facility] - [place], [place] - [address] [facility]  [address] [facility] [address]     Phone:  [phone]     amLODIPine 5 MG tablet    lisinopril 20 MG tablet          Primary Care Provider Office Phone # Fax #    Sylvester Alexsander Sequeira [phone] [address] [facility] [address]        Equal Access to Services     FREDY LUNA : Hadii vivek rodrigues hadasho Soomaali, waaxda luqadaha, qaybta kaalmada osmin jernigan . So [facility] [phone].    ATENCIÓN: Si habla vasuañol, tiene a hoskins disposición servicios gratuitos de asistencia lingüística. Llame al [phone].    We comply with applicable federal civil rights laws and [place] laws. We do not discriminate on the basis of race, color, national origin, age, disability, sex, sexual orientation, or gender identity.            Thank you!     Thank you for choosing [facility]  for your care. Our goal is " always to provide you with excellent care. Hearing back from our patients is one way we can continue to improve our services. Please take a few minutes to complete the written survey that you may receive in the mail after your visit with us. Thank you!             Your Updated Medication List - Protect others around you: Learn how to safely use, store and throw away your medicines at www.disposemymeds.org.          This list is accurate as of: 1/18/18  9:27 AM.  Always use your most recent med list.                   Brand Name Dispense Instructions for use Diagnosis    * amLODIPine 5 MG tablet    NORVASC     TAKE 1 TABLET DAILY    Essential hypertension, benign       * amLODIPine 5 MG tablet    NORVASC    90 tablet    Take 1 tablet (5 mg) by mouth daily    Essential hypertension, benign       ASPIRIN ADULT LOW STRENGTH PO      Take 81 mg by mouth daily.        lisinopril 20 MG tablet    PRINIVIL/ZESTRIL    90 tablet    Take 1 tablet (20 mg) by mouth daily    Essential hypertension, benign       * Notice:  This list has 2 medication(s) that are the same as other medications prescribed for you. Read the directions carefully, and ask your doctor or other care provider to review them with you.

## 2018-01-19 LAB
ALBUMIN SERPL-MCNC: 4.1 G/DL (ref 3.4–5)
ALP SERPL-CCNC: 82 U/L (ref 40–150)
ALT SERPL W P-5'-P-CCNC: 30 U/L (ref 0–70)
ANION GAP SERPL CALCULATED.3IONS-SCNC: 7 MMOL/L (ref 3–14)
AST SERPL W P-5'-P-CCNC: 33 U/L (ref 0–45)
BILIRUB SERPL-MCNC: 0.8 MG/DL (ref 0.2–1.3)
BUN SERPL-MCNC: 14 MG/DL (ref 7–30)
CALCIUM SERPL-MCNC: 9.1 MG/DL (ref 8.5–10.1)
CHLORIDE SERPL-SCNC: 102 MMOL/L (ref 94–109)
CHOLEST SERPL-MCNC: 187 MG/DL
CO2 SERPL-SCNC: 29 MMOL/L (ref 20–32)
CREAT SERPL-MCNC: 0.72 MG/DL (ref 0.66–1.25)
GFR SERPL CREATININE-BSD FRML MDRD: >90 ML/MIN/1.7M2
GLUCOSE SERPL-MCNC: 94 MG/DL (ref 70–99)
HCV AB SERPL QL IA: NONREACTIVE
HDLC SERPL-MCNC: 50 MG/DL
LDLC SERPL CALC-MCNC: 111 MG/DL
NONHDLC SERPL-MCNC: 137 MG/DL
POTASSIUM SERPL-SCNC: 4 MMOL/L (ref 3.4–5.3)
PROT SERPL-MCNC: 7.2 G/DL (ref 6.8–8.8)
SODIUM SERPL-SCNC: 138 MMOL/L (ref 133–144)
TRIGL SERPL-MCNC: 130 MG/DL

## 2018-01-31 ENCOUNTER — OFFICE VISIT (OUTPATIENT)
Dept: UROLOGY | Facility: CLINIC | Age: 73
End: 2018-01-31
Payer: MEDICARE

## 2018-01-31 VITALS — HEART RATE: 66 BPM | BODY MASS INDEX: 20.88 KG/M2 | OXYGEN SATURATION: 99 % | HEIGHT: 67 IN | WEIGHT: 133 LBS

## 2018-01-31 DIAGNOSIS — N40.0 ENLARGED PROSTATE: Primary | ICD-10-CM

## 2018-01-31 DIAGNOSIS — R97.20 ELEVATED PROSTATE SPECIFIC ANTIGEN (PSA): ICD-10-CM

## 2018-01-31 PROCEDURE — 99213 OFFICE O/P EST LOW 20 MIN: CPT | Performed by: UROLOGY

## 2018-01-31 ASSESSMENT — PAIN SCALES - GENERAL: PAINLEVEL: NO PAIN (0)

## 2018-01-31 NOTE — PROGRESS NOTES
Office Visit Note  [facility]  [phone]    UROLOGIC DIAGNOSES:   Elevated PSA and enlarged prostate    CURRENT INTERVENTIONS:   Negative prostate biopsies x 2, negative MRI    HISTORY:   Chris returns to clinic today for PSA and exam. His PSA this year is down at 11.7. He continues to have no urinary symptoms or complaints.      PAST MEDICAL HISTORY:   Past Medical History:   Diagnosis Date     Enlarged prostate     per pt reprt     Hypertension      PSA elevation        PAST SURGICAL HISTORY:   Past Surgical History:   Procedure Laterality Date     COLONOSCOPY  7/24/2013    Dr. Craig [facility]     COLONOSCOPY  7/24/2013    Procedure: COMBINED COLONOSCOPY, SINGLE BIOPSY/POLYPECTOMY BY BIOPSY;  Colonoscopy with polypectomy using cold forceps.;  Surgeon: Zach Craig MD;  Location:  GI       FAMILY HISTORY:   Family History   Problem Relation Age of Onset     Hypertension Mother      Alzheimer Disease Mother      dementia     CANCER Father 64     lymphoma cancer     HEART DISEASE Father      triple by-pass       SOCIAL HISTORY:   Social History   Substance Use Topics     Smoking status: Never Smoker     Smokeless tobacco: Never Used     Alcohol use Yes      Comment: beer or wine socially occasionally       Current Outpatient Prescriptions   Medication     amLODIPine (NORVASC) 5 MG tablet     lisinopril (PRINIVIL/ZESTRIL) 20 MG tablet     amLODIPine (NORVASC) 5 MG tablet     ASPIRIN ADULT LOW STRENGTH PO     No current facility-administered medications for this visit.          PHYSICAL EXAM:    There were no vitals taken for this visit.    HEENT: Normocephalic and atraumatic   Cardiac: Not done  Back/Flank: Not done  CNS/PNS: Not done  Respiratory: Normal non-labored breathing  Abdomen: Soft nontender and nondistended  Peripheral Vascular: Not done  Mental Status: Not done    Penis: Not done  Scrotal Skin: Not done  Testicles: Not done  Epididymis: Not done  Digital Rectal Exam: His prostate is very  enlarged but benign and symmetric to palpation    Cystoscopy: Not done    Imaging: None    Urinalysis: UA RESULTS:  Recent Labs   Lab Test  02/02/10   0811   COLOR  Yellow   APPEARANCE  Clear   URINEGLC  Negative   URINEBILI  Negative   URINEKETONE  Negative   SG  1.020   UBLD  Negative   URINEPH  7.5*   PROTEIN  Negative   UROBILINOGEN  0.2   NITRITE  Negative   LEUKEST  Negative       PSA: 11.7    Post Void Residual:     Other labs: None today      IMPRESSION:  Enlarged prostate and elevated PSA    PLAN:  The PSA continues to be stable. His examination is benign. I recommended that we follow this annually until at least the age of 75. He will have his annual physical again in January next year and then I'll see him back for his exam and to go over the PSA    Total Time: 15 minutes                                      Total in Consultation: 15 minutes      Chaparro Patterson M.D.

## 2018-01-31 NOTE — LETTER
1/31/2018       RE: Paul Smith  [address] [facility] [address]     Dear Colleague,    Thank you for referring your patient, Paul Smith, to the [facility] [place] [facility]. Please see a copy of my visit note below.    Office Visit Note  [facility]  [phone]    UROLOGIC DIAGNOSES:   Elevated PSA and enlarged prostate    CURRENT INTERVENTIONS:   Negative prostate biopsies x 2, negative MRI    HISTORY:   Chris returns to clinic today for PSA and exam. His PSA this year is down at 11.7. He continues to have no urinary symptoms or complaints.      PAST MEDICAL HISTORY:   Past Medical History:   Diagnosis Date     Enlarged prostate     per pt reprt     Hypertension      PSA elevation        PAST SURGICAL HISTORY:   Past Surgical History:   Procedure Laterality Date     COLONOSCOPY  7/24/2013    Dr. Craig [facility]     COLONOSCOPY  7/24/2013    Procedure: COMBINED COLONOSCOPY, SINGLE BIOPSY/POLYPECTOMY BY BIOPSY;  Colonoscopy with polypectomy using cold forceps.;  Surgeon: Zach Craig MD;  Location:  GI       FAMILY HISTORY:   Family History   Problem Relation Age of Onset     Hypertension Mother      Alzheimer Disease Mother      dementia     CANCER Father 64     lymphoma cancer     HEART DISEASE Father      triple by-pass       SOCIAL HISTORY:   Social History   Substance Use Topics     Smoking status: Never Smoker     Smokeless tobacco: Never Used     Alcohol use Yes      Comment: beer or wine socially occasionally       Current Outpatient Prescriptions   Medication     amLODIPine (NORVASC) 5 MG tablet     lisinopril (PRINIVIL/ZESTRIL) 20 MG tablet     amLODIPine (NORVASC) 5 MG tablet     ASPIRIN ADULT LOW STRENGTH PO     No current facility-administered medications for this visit.          PHYSICAL EXAM:    There were no vitals taken for this visit.    HEENT: Normocephalic and atraumatic   Cardiac: Not  done  Back/Flank: Not done  CNS/PNS: Not done  Respiratory: Normal non-labored breathing  Abdomen: Soft nontender and nondistended  Peripheral Vascular: Not done  Mental Status: Not done    Penis: Not done  Scrotal Skin: Not done  Testicles: Not done  Epididymis: Not done  Digital Rectal Exam: His prostate is very enlarged but benign and symmetric to palpation    Cystoscopy: Not done    Imaging: None    Urinalysis: UA RESULTS:  Recent Labs   Lab Test  02/02/10   0811   COLOR  Yellow   APPEARANCE  Clear   URINEGLC  Negative   URINEBILI  Negative   URINEKETONE  Negative   SG  1.020   UBLD  Negative   URINEPH  7.5*   PROTEIN  Negative   UROBILINOGEN  0.2   NITRITE  Negative   LEUKEST  Negative       PSA: 11.7    Post Void Residual:     Other labs: None today      IMPRESSION:  Enlarged prostate and elevated PSA    PLAN:  The PSA continues to be stable. His examination is benign. I recommended that we follow this annually until at least the age of 75. He will have his annual physical again in January next year and then I'll see him back for his exam and to go over the PSA    Total Time: 15 minutes                                      Total in Consultation: 15 minutes      Chaparro Patterson M.D.

## 2018-01-31 NOTE — MR AVS SNAPSHOT
After Visit Summary   1/31/2018    Paul Smith    MRN: [id number]           Patient Information     Date Of Birth          1945        Visit Information        Provider Department      1/31/2018 8:45 AM Chaparro Patterson MD [facility]        Today's Diagnoses     Enlarged prostate    -  1    Elevated prostate specific antigen (PSA)           Follow-ups after your visit        Follow-up notes from your care team     Return in about 1 year (around 1/31/2019) for PSA.      Your next 10 appointments already scheduled     Jan 31, 2018  8:45 AM CST   Return Visit with Chaparro Patterson MD   [facility] ([facility])    [address]  [address]  [facility] [address] [phone]              Who to contact     If you have questions or need follow up information about today's clinic visit or your schedule please contact [facility] directly at [phone].  Normal or non-critical lab and imaging results will be communicated to you by [employer], letter or phone within 4 business days after the clinic has received the results. If you do not hear from us within 7 days, please contact the clinic through [employer] or phone. If you have a critical or abnormal lab result, we will notify you by phone as soon as possible.  Submit refill requests through [employer] or call your pharmacy and they will forward the refill request to us. Please allow 3 business days for your refill to be completed.          Additional Information About Your Visit        [employer] Information     [employer] gives you secure access to your electronic health record. If you see a primary care provider, you can also send messages to your care team and make appointments. If you have questions, please call your primary care clinic.  If you do not have a primary care provider,  "please call [phone] and they will assist you.        Care EveryWhere ID     This is your Care EveryWhere ID. This could be used by other organizations to access your [place] medical records  [id number]        Your Vitals Were     Pulse Height Pulse Oximetry BMI (Body Mass Index)          66 1.702 m (5' 7\") 99% 20.83 kg/m2         Blood Pressure from Last 3 Encounters:   01/18/18 122/84   01/17/17 124/82   01/14/16 138/78    Weight from Last 3 Encounters:   01/31/18 60.3 kg (133 lb)   01/18/18 60.3 kg (133 lb)   01/25/17 63.5 kg (140 lb)              Today, you had the following     No orders found for display       Primary Care Provider Office Phone # Fax #    Sylvester Sequeira [phone] [address] [facility] [address]        Equal Access to Services     FREDY LUNA : Hadii aad ku hadasho Soomaali, waaxda luqadaha, qaybta kaalmada adeegyada, waxay kettyin haywhitn afshin montes . So [facility] [phone].    ATENCIÓN: Si yeceniala español, tiene a hoskins disposición servicios gratuitos de asistencia lingüística. Llame al [phone].    We comply with applicable federal civil rights laws and [place] laws. We do not discriminate on the basis of race, color, national origin, age, disability, sex, sexual orientation, or gender identity.            Thank you!     Thank you for choosing [facility] [place]  for your care. Our goal is always to provide you with excellent care. Hearing back from our patients is one way we can continue to improve our services. Please take a few minutes to complete the written survey that you may receive in the mail after your visit with us. Thank you!             Your Updated Medication List - Protect others around you: Learn how to safely use, store and throw away your medicines at www.disposemymeds.org.          This list is accurate as of 1/31/18  8:41 AM.  Always use your most recent med list.                "    Brand Name Dispense Instructions for use Diagnosis    * amLODIPine 5 MG tablet    NORVASC     TAKE 1 TABLET DAILY    Essential hypertension, benign       * amLODIPine 5 MG tablet    NORVASC    90 tablet    Take 1 tablet (5 mg) by mouth daily    Essential hypertension, benign       ASPIRIN ADULT LOW STRENGTH PO      Take 81 mg by mouth daily.        lisinopril 20 MG tablet    PRINIVIL/ZESTRIL    90 tablet    Take 1 tablet (20 mg) by mouth daily    Essential hypertension, benign       * Notice:  This list has 2 medication(s) that are the same as other medications prescribed for you. Read the directions carefully, and ask your doctor or other care provider to review them with you.

## 2018-04-18 DIAGNOSIS — I10 ESSENTIAL HYPERTENSION, BENIGN: ICD-10-CM

## 2018-04-18 RX ORDER — AMLODIPINE BESYLATE 5 MG/1
TABLET ORAL
Qty: 90 TABLET | Refills: 2 | Status: SHIPPED | OUTPATIENT
Start: 2018-04-18 | End: 2022-03-23

## 2018-04-18 NOTE — TELEPHONE ENCOUNTER
Duplicate, has refills  Prescription approved per [facility] Refill Protocol.  Adina Pandey RN, BSN

## 2018-04-18 NOTE — TELEPHONE ENCOUNTER
"Requested Prescriptions   Pending Prescriptions Disp Refills     amLODIPine (NORVASC) 5 MG tablet [Pharmacy Med Name: AMLODIPINE TAB 5MG]  Last Written Prescription Date:  01/18/2018  Last Fill Quantity: 90 tablet,  # refills: 3   Last office visit: 1/18/2018 with prescribing provider:  Sylvester Sequeira MD    Future Office Visit:     90 tablet 1     Sig: TAKE 1 TABLET DAILY.    Calcium Channel Blockers Protocol  Passed    4/18/2018  8:49 AM       Passed - Blood pressure under 140/90 in past 12 months    BP Readings from Last 3 Encounters:   01/18/18 122/84   01/17/17 124/82   01/14/16 138/78                Passed - Recent (12 mo) or future (30 days) visit within the authorizing provider's specialty    Patient had office visit in the last 12 months or has a visit in the next 30 days with authorizing provider or within the authorizing provider's specialty.  See \"Patient Info\" tab in inbasket, or \"Choose Columns\" in Meds & Orders section of the refill encounter.           Passed - Patient is age 18 or older       Passed - Normal serum creatinine on file in past 12 months    Recent Labs   Lab Test  01/18/18   0918   CR  0.72               "

## 2018-05-27 DIAGNOSIS — I10 ESSENTIAL HYPERTENSION, BENIGN: ICD-10-CM

## 2018-05-29 NOTE — TELEPHONE ENCOUNTER
"Last Written Prescription Date:  1/18/18  Last Fill Quantity: 90 tablet,  # refills: 3   Last office visit: 1/18/2018 with prescribing provider:  Fabby   Future Office Visit:      Requested Prescriptions   Pending Prescriptions Disp Refills     lisinopril (PRINIVIL/ZESTRIL) 20 MG tablet [Pharmacy Med Name: LISINOPRIL TAB 20MG] 90 tablet 0     Sig: TAKE 1 TABLET DAILY    ACE Inhibitors (Including Combos) Protocol Passed    5/27/2018  2:38 PM       Passed - Blood pressure under 140/90 in past 12 months    BP Readings from Last 3 Encounters:   01/18/18 122/84   01/17/17 124/82   01/14/16 138/78                Passed - Recent (12 mo) or future (30 days) visit within the authorizing provider's specialty    Patient had office visit in the last 12 months or has a visit in the next 30 days with authorizing provider or within the authorizing provider's specialty.  See \"Patient Info\" tab in inbasket, or \"Choose Columns\" in Meds & Orders section of the refill encounter.           Passed - Patient is age 18 or older       Passed - Normal serum creatinine on file in past 12 months    Recent Labs   Lab Test  01/18/18 0918   CR  0.72            Passed - Normal serum potassium on file in past 12 months    Recent Labs   Lab Test  01/18/18 0918   POTASSIUM  4.0               "

## 2018-05-30 RX ORDER — LISINOPRIL 20 MG/1
TABLET ORAL
Qty: 90 TABLET | Refills: 2 | Status: SHIPPED | OUTPATIENT
Start: 2018-05-30 | End: 2019-04-15

## 2018-05-30 NOTE — TELEPHONE ENCOUNTER
Prescription approved per [facility] Refill Protocol, has refills  Adina Pandey RN, BSN

## 2018-12-28 DIAGNOSIS — R97.20 ELEVATED PROSTATE SPECIFIC ANTIGEN (PSA): Primary | ICD-10-CM

## 2019-01-21 ENCOUNTER — OFFICE VISIT (OUTPATIENT)
Dept: FAMILY MEDICINE | Facility: CLINIC | Age: 74
End: 2019-01-21
Payer: MEDICARE

## 2019-01-21 VITALS
HEIGHT: 67 IN | OXYGEN SATURATION: 99 % | BODY MASS INDEX: 20.95 KG/M2 | TEMPERATURE: 97.5 F | HEART RATE: 63 BPM | DIASTOLIC BLOOD PRESSURE: 88 MMHG | WEIGHT: 133.5 LBS | SYSTOLIC BLOOD PRESSURE: 128 MMHG

## 2019-01-21 DIAGNOSIS — Z23 NEED FOR PROPHYLACTIC VACCINATION AND INOCULATION AGAINST INFLUENZA: ICD-10-CM

## 2019-01-21 DIAGNOSIS — R97.20 ELEVATED PROSTATE SPECIFIC ANTIGEN (PSA): ICD-10-CM

## 2019-01-21 DIAGNOSIS — Z00.01 ENCOUNTER FOR WELL ADULT EXAM WITH ABNORMAL FINDINGS: Primary | ICD-10-CM

## 2019-01-21 PROCEDURE — G0439 PPPS, SUBSEQ VISIT: HCPCS | Performed by: FAMILY MEDICINE

## 2019-01-21 PROCEDURE — 90662 IIV NO PRSV INCREASED AG IM: CPT | Performed by: FAMILY MEDICINE

## 2019-01-21 PROCEDURE — 36415 COLL VENOUS BLD VENIPUNCTURE: CPT | Performed by: FAMILY MEDICINE

## 2019-01-21 PROCEDURE — G0008 ADMIN INFLUENZA VIRUS VAC: HCPCS | Performed by: FAMILY MEDICINE

## 2019-01-21 PROCEDURE — 80061 LIPID PANEL: CPT | Performed by: FAMILY MEDICINE

## 2019-01-21 PROCEDURE — 84153 ASSAY OF PSA TOTAL: CPT | Performed by: FAMILY MEDICINE

## 2019-01-21 PROCEDURE — 80053 COMPREHEN METABOLIC PANEL: CPT | Performed by: FAMILY MEDICINE

## 2019-01-21 ASSESSMENT — ENCOUNTER SYMPTOMS
CHILLS: 0
HEADACHES: 0
WEAKNESS: 0
NAUSEA: 0
HEARTBURN: 0
HEMATOCHEZIA: 0
FREQUENCY: 1
DIZZINESS: 0
PARESTHESIAS: 0
COUGH: 0
PALPITATIONS: 0
SORE THROAT: 0
DYSURIA: 0
ABDOMINAL PAIN: 0
ARTHRALGIAS: 0
JOINT SWELLING: 0
DIARRHEA: 0
NERVOUS/ANXIOUS: 0
CONSTIPATION: 0
HEMATURIA: 0
EYE PAIN: 0
SHORTNESS OF BREATH: 0
FEVER: 0

## 2019-01-21 ASSESSMENT — ACTIVITIES OF DAILY LIVING (ADL): CURRENT_FUNCTION: NO ASSISTANCE NEEDED

## 2019-01-21 ASSESSMENT — MIFFLIN-ST. JEOR: SCORE: 1314.18

## 2019-01-21 NOTE — PROGRESS NOTES

## 2019-01-21 NOTE — PATIENT INSTRUCTIONS
Preventive Health Recommendations:     See your health care provider every year to    Review health changes.     Discuss preventive care.      Review your medicines if your doctor has prescribed any.      Talk with your health care provider about whether you should have a test to screen for prostate cancer (PSA).    Every 3 years, have a diabetes test (fasting glucose). If you are at risk for diabetes, you should have this test more often.    Every 5 years, have a cholesterol test. Have this test more often if you are at risk for high cholesterol or heart disease.     Every 10 years, have a colonoscopy. Or, have a yearly FIT test (stool test). These exams will check for colon cancer.    Talk to with your health care provider about screening for Abdominal Aortic Aneurysm if you have a family history of AAA or have a history of smoking.    Shots:     Get a flu shot each year.     Get a tetanus shot every 10 years.     Talk to your doctor about your pneumonia vaccines. There are now two you should receive - Pneumovax (PPSV 23) and Prevnar (PCV 13).     Talk to your pharmacist about a shingles vaccine.     Talk to your doctor about the hepatitis B vaccine.  Nutrition:     Eat at least 5 servings of fruits and vegetables each day.     Eat whole-grain bread, whole-wheat pasta and brown rice instead of white grains and rice.     Get adequate Calcium and Vitamin D.   Lifestyle    Exercise for at least 150 minutes a week (30 minutes a day, 5 days a week). This will help you control your weight and prevent disease.     Limit alcohol to one drink per day.     No smoking.     Wear sunscreen to prevent skin cancer.    See your dentist every six months for an exam and cleaning.    See your eye doctor every 1 to 2 years to screen for conditions such as glaucoma, macular degeneration, cataracts, etc.    Personalized Prevention Plan  You are due for the preventive services outlined below.  Your care team is available to assist you  in scheduling these services.  If you have already completed any of these items, please share that information with your care team to update in your medical record.  Health Maintenance Due   Topic Date Due     Zoster (Chicken Pox) Vaccine (1 of 2) 06/20/1996     AORTIC ANEURYSM SCREENING (SYSTEM ASSIGNED)  06/20/2011     Flu Vaccine (1) 09/01/2018     Creatinine Lab - yearly  01/18/2019     Medicare Annual Wellness Visit  01/18/2019     FALL RISK ASSESSMENT  01/18/2019     Depression Assessment 2 - yearly  01/18/2019

## 2019-01-21 NOTE — PROGRESS NOTES
"SUBJECTIVE:   Paul Smith is a 73 year old male who presents for Preventive Visit.  click delete button to remove this line now  click delete button to remove this line now  Are you in the first 12 months of your Medicare coverage?  No    Annual Wellness Visit     In general, how would you rate your overall health?  Excellent    Frequency of exercise:  6-7 days/week    Do you usually eat at least 4 servings of fruit and vegetables a day, include whole grains    & fiber and avoid regularly eating high fat or \"junk\" foods?  Yes    Taking medications regularly:  No    Medication side effects:  None    Ability to successfully perform activities of daily living:  No assistance needed    Home Safety:  No safety concerns identified    Hearing Impairment:  No hearing concerns    In the past 6 months, have you been bothered by leaking of urine?  No    In general, how would you rate your overall mental or emotional health?  Excellent    PHQ-2 Total Score: 0    Additional concerns today:  No    Do you feel safe in your environment? Yes    Do you have a Health Care Directive? No: Advance care planning reviewed with patient; information given to patient to review.      Fall risk       Cognitive Screening   1) Repeat 3 items (Leader, Season, Table)    2) Clock draw: NORMAL  3) 3 item recall: Recalls 3 objects  Results: 3 items recalled: COGNITIVE IMPAIRMENT LESS LIKELY    Mini-CogTM Copyright S Cristian. Licensed by the author for use in [facility]; reprinted with permission (tim@.[facility]). All rights reserved.      Do you have sleep apnea, excessive snoring or daytime drowsiness?: no    Reviewed and updated as needed this visit by clinical staff         Reviewed and updated as needed this visit by Provider        Social History     Tobacco Use     Smoking status: Never Smoker     Smokeless tobacco: Never Used   Substance Use Topics     Alcohol use: Yes     Comment: beer or wine socially occasionally       Alcohol " Use 1/21/2019   If you drink alcohol do you typically have greater than 3 drinks per day OR greater than 7 drinks per week? No               Current providers sharing in care for this patient include:   Patient Care Team:  Sylvester Sequeira MD as PCP - General (Family Practice)  Sylvester Sequeira MD as PCP - Assigned PCP    The following health maintenance items are reviewed in Epic and correct as of today:  Health Maintenance   Topic Date Due     ZOSTER IMMUNIZATION (1 of 2) 06/20/1996     AORTIC ANEURYSM SCREENING (SYSTEM ASSIGNED)  06/20/2011     INFLUENZA VACCINE (1) 09/01/2018     CREATININE Q1 YEAR  01/18/2019     MEDICARE ANNUAL WELLNESS VISIT  01/18/2019     FALL RISK ASSESSMENT  01/18/2019     PHQ-2 Q1 YR  01/18/2019     DTAP/TDAP/TD IMMUNIZATION (2 - Td) 02/01/2021     ADVANCE DIRECTIVE PLANNING Q5 YRS  07/22/2022     LIPID SCREEN Q5 YR MALE (SYSTEM ASSIGNED)  01/18/2023     COLON CANCER SCREEN (SYSTEM ASSIGNED)  07/24/2023     HEPATITIS C SCREENING  Completed     IPV IMMUNIZATION  Aged Out     MENINGITIS IMMUNIZATION  Aged Out     BP Readings from Last 3 Encounters:   01/21/19 128/88   01/18/18 122/84   01/17/17 124/82    Wt Readings from Last 3 Encounters:   01/21/19 60.6 kg (133 lb 8 oz)   01/31/18 60.3 kg (133 lb)   01/18/18 60.3 kg (133 lb)                  Pneumonia Vaccine:Adults age 65+ who received Pneumovax (PPSV23) at 65 years or older: Should be given PCV13 > 1 year after their most recent PPSV23    Review of Systems   Constitutional: Negative for chills and fever.   HENT: Negative for congestion, ear pain, hearing loss and sore throat.    Eyes: Negative for pain and visual disturbance.   Respiratory: Negative for cough and shortness of breath.    Cardiovascular: Negative for chest pain, palpitations and peripheral edema.   Gastrointestinal: Negative for abdominal pain, constipation, diarrhea, heartburn, hematochezia and nausea.   Genitourinary: Positive for frequency. Negative for  "discharge, dysuria, genital sores, hematuria, impotence and urgency.   Musculoskeletal: Negative for arthralgias and joint swelling.   Skin: Negative for rash.   Neurological: Negative for dizziness, weakness, headaches and paresthesias.   Psychiatric/Behavioral: Negative for mood changes. The patient is not nervous/anxious.      Constitutional, HEENT, cardiovascular, pulmonary, GI, , musculoskeletal, neuro, skin, endocrine and psych systems are negative, except as otherwise noted.    OBJECTIVE:   There were no vitals taken for this visit. Estimated body mass index is 20.83 kg/m  as calculated from the following:    Height as of 1/31/18: 1.702 m (5' 7\").    Weight as of 1/31/18: 60.3 kg (133 lb).   /88 (BP Location: Right arm, Patient Position: Chair, Cuff Size: Adult Regular)   Pulse 63   Temp 97.5  F (36.4  C)   Ht 1.702 m (5' 7\")   Wt 60.6 kg (133 lb 8 oz)   SpO2 99%   BMI 20.91 kg/m      Physical Exam  GENERAL: healthy, alert and no distress  EYES: Eyes grossly normal to inspection, PERRL and conjunctivae and sclerae normal  HENT: ear canals and TM's normal, nose and mouth without ulcers or lesions  NECK: no adenopathy, no asymmetry, masses, or scars and thyroid normal to palpation  RESP: lungs clear to auscultation - no rales, rhonchi or wheezes  CV: regular rate and rhythm, normal S1 S2, no S3 or S4, no murmur, click or rub, no peripheral edema and peripheral pulses strong  ABDOMEN: soft, nontender, no hepatosplenomegaly, no masses and bowel sounds normal  MS: no gross musculoskeletal defects noted, no edema  SKIN: no suspicious lesions or rashes  NEURO: Normal strength and tone, mentation intact and speech normal  PSYCH: mentation appears normal, affect normal/bright    Diagnostic Test Results:    (Z00.01) Encounter for well adult exam with abnormal findings  (primary encounter diagnosis)  Comment:   Plan: Comprehensive metabolic panel, Lipid panel         reflex to direct LDL Fasting        " "    (R97.20) Elevated prostate specific antigen (PSA)  Comment:   Plan: PSA, tumor marker          SEND PSA RESULT TO UROLOGY ALSO     End of Life Planning:  Patient currently has an advanced directive: Yes.  Practitioner is supportive of decision.    COUNSELING:  Reviewed preventive health counseling, as reflected in patient instructions       Regular exercise       Healthy diet/nutrition       Vision screening    BP Readings from Last 1 Encounters:   01/18/18 122/84     Estimated body mass index is 20.83 kg/m  as calculated from the following:    Height as of 1/31/18: 1.702 m (5' 7\").    Weight as of 1/31/18: 60.3 kg (133 lb).            reports that  has never smoked. he has never used smokeless tobacco.      Appropriate preventive services were discussed with this patient, including applicable screening as appropriate for cardiovascular disease, diabetes, osteopenia/osteoporosis, and glaucoma.  As appropriate for age/gender, discussed screening for colorectal cancer, prostate cancer, breast cancer, and cervical cancer. Checklist reviewing preventive services available has been given to the patient.    Reviewed patients plan of care and provided an AVS. The Basic Care Plan (routine screening as documented in Health Maintenance) for Paul meets the Care Plan requirement. This Care Plan has been established and reviewed with the Patient.    Counseling Resources:  ATP IV Guidelines  Pooled Cohorts Equation Calculator  Breast Cancer Risk Calculator  FRAX Risk Assessment  ICSI Preventive Guidelines  Dietary Guidelines for Americans, 2010  USDA's MyPlate  ASA Prophylaxis  Lung CA Screening    Sylvester Sequeira MD  [facility]  "

## 2019-01-22 LAB
ALBUMIN SERPL-MCNC: 4.1 G/DL (ref 3.4–5)
ALP SERPL-CCNC: 94 U/L (ref 40–150)
ALT SERPL W P-5'-P-CCNC: 28 U/L (ref 0–70)
ANION GAP SERPL CALCULATED.3IONS-SCNC: 7 MMOL/L (ref 3–14)
AST SERPL W P-5'-P-CCNC: 27 U/L (ref 0–45)
BILIRUB SERPL-MCNC: 0.7 MG/DL (ref 0.2–1.3)
BUN SERPL-MCNC: 11 MG/DL (ref 7–30)
CALCIUM SERPL-MCNC: 9.4 MG/DL (ref 8.5–10.1)
CHLORIDE SERPL-SCNC: 102 MMOL/L (ref 94–109)
CHOLEST SERPL-MCNC: 189 MG/DL
CO2 SERPL-SCNC: 28 MMOL/L (ref 20–32)
CREAT SERPL-MCNC: 0.79 MG/DL (ref 0.66–1.25)
GFR SERPL CREATININE-BSD FRML MDRD: 89 ML/MIN/{1.73_M2}
GLUCOSE SERPL-MCNC: 97 MG/DL (ref 70–99)
HDLC SERPL-MCNC: 47 MG/DL
LDLC SERPL CALC-MCNC: 124 MG/DL
NONHDLC SERPL-MCNC: 142 MG/DL
POTASSIUM SERPL-SCNC: 4.3 MMOL/L (ref 3.4–5.3)
PROT SERPL-MCNC: 7.5 G/DL (ref 6.8–8.8)
PSA SERPL-MCNC: 14.8 UG/L (ref 0–4)
SODIUM SERPL-SCNC: 137 MMOL/L (ref 133–144)
TRIGL SERPL-MCNC: 89 MG/DL

## 2019-02-04 ENCOUNTER — OFFICE VISIT (OUTPATIENT)
Dept: UROLOGY | Facility: CLINIC | Age: 74
End: 2019-02-04
Payer: MEDICARE

## 2019-02-04 VITALS — HEIGHT: 67 IN | WEIGHT: 133 LBS | BODY MASS INDEX: 20.88 KG/M2 | OXYGEN SATURATION: 99 % | HEART RATE: 72 BPM

## 2019-02-04 DIAGNOSIS — N40.0 ENLARGED PROSTATE: ICD-10-CM

## 2019-02-04 DIAGNOSIS — R97.20 ELEVATED PROSTATE SPECIFIC ANTIGEN (PSA): Primary | ICD-10-CM

## 2019-02-04 PROCEDURE — 99214 OFFICE O/P EST MOD 30 MIN: CPT | Performed by: UROLOGY

## 2019-02-04 ASSESSMENT — PAIN SCALES - GENERAL: PAINLEVEL: NO PAIN (0)

## 2019-02-04 ASSESSMENT — MIFFLIN-ST. JEOR: SCORE: 1311.91

## 2019-02-04 NOTE — PROGRESS NOTES
Office Visit Note  [facility]  [phone]    UROLOGIC DIAGNOSES:   Elevated PSA and enlarged prostate    CURRENT INTERVENTIONS:   Negative prostate biopsies ×2, negative MRI    HISTORY:   Chris returns to clinic today annual prostate follow-up. He reports no urinary symptoms or complaints at this time. This year the PSA is back up again, now at 14.8.      PAST MEDICAL HISTORY:   Past Medical History:   Diagnosis Date     Enlarged prostate     per pt reprt     Hypertension      PSA elevation        PAST SURGICAL HISTORY:   Past Surgical History:   Procedure Laterality Date     COLONOSCOPY  7/24/2013    Dr. Craig [facility]     COLONOSCOPY  7/24/2013    Procedure: COMBINED COLONOSCOPY, SINGLE BIOPSY/POLYPECTOMY BY BIOPSY;  Colonoscopy with polypectomy using cold forceps.;  Surgeon: Zach Craig MD;  Location:  GI       FAMILY HISTORY:   Family History   Problem Relation Age of Onset     Hypertension Mother      Alzheimer Disease Mother         dementia     Cancer Father 64        lymphoma cancer     Heart Disease Father         triple by-pass       SOCIAL HISTORY:   Social History     Tobacco Use     Smoking status: Never Smoker     Smokeless tobacco: Never Used   Substance Use Topics     Alcohol use: Yes     Comment: beer or wine socially occasionally       Current Outpatient Medications   Medication     amLODIPine (NORVASC) 5 MG tablet     ASPIRIN ADULT LOW STRENGTH PO     lisinopril (PRINIVIL/ZESTRIL) 20 MG tablet     No current facility-administered medications for this visit.          PHYSICAL EXAM:    There were no vitals taken for this visit.    Constitutional: Well developed. Conversant and in no acute distress  Eyes: Anicteric sclera, conjunctiva clear, normal extraocular movements  ENT: Normocephalic and atraumatic,   Skin: Warm and dry. No rashes or lesions  Cardiac: No peripheral edema  Back/Flank: Not done  CNS/PNS: Normal musculature and movements, moves all extremities normally  Respiratory:  Normal non-labored breathing  Abdomen: Soft nontender and nondistended  Peripheral Vascular: No peripheral edema  Mental Status/Psych: Alert and Oriented x 3. Normal mood and affect    Penis: Not done  Scrotal Skin: Not done  Testicles: Not done  Epididymis: Not done  Digital Rectal Exam: his prostate is moderately enlarged, benign and symmetric to palpation    Cystoscopy: Not done    Imaging: None    Urinalysis: UA RESULTS:  No results for input(s): COLOR, APPEARANCE, URINEGLC, URINEBILI, URINEKETONE, SG, UBLD, URINEPH, PROTEIN, UROBILINOGEN, NITRITE, LEUKEST, RBCU, WBCU in the last [id number] hours.    PSA: 14.8    Post Void Residual:     Other labs: None today      IMPRESSION:  Elevated PSA and enlarged prostate    PLAN:  His PSA has risen again over the past year. He has seen previous PSA jumps that have resolved spontaneously in the past.  His examination is unchanged. We discussed the risks for a potential prostate cancer. For now recommended we follow this a more closely. I recommended he come back in 6 months for another PSA and exam.      Chaparro Patterson M.D.

## 2019-02-04 NOTE — NURSING NOTE
Pt denies any new voiding trouble.  Pt states its been the same for many years.  psa in br[place].  JAIME Rodas CMA

## 2019-02-04 NOTE — LETTER
RE: Paul Smith  [address] [address]     Dear Colleague,    Thank you for referring your patient, Paul Smith, to the [facility] UROLOGY CLINIC [place] at [facility]. Please see a copy of my visit note below.    Office Visit Note  [facility]  [phone]    UROLOGIC DIAGNOSES:   Elevated PSA and enlarged prostate    CURRENT INTERVENTIONS:   Negative prostate biopsies ×2, negative MRI    HISTORY:   Chris returns to clinic today annual prostate follow-up. He reports no urinary symptoms or complaints at this time. This year the PSA is back up again, now at 14.8.      PAST MEDICAL HISTORY:   Past Medical History:   Diagnosis Date     Enlarged prostate     per pt reprt     Hypertension      PSA elevation        PAST SURGICAL HISTORY:   Past Surgical History:   Procedure Laterality Date     COLONOSCOPY  7/24/2013    Dr. Craig [facility]     COLONOSCOPY  7/24/2013    Procedure: COMBINED COLONOSCOPY, SINGLE BIOPSY/POLYPECTOMY BY BIOPSY;  Colonoscopy with polypectomy using cold forceps.;  Surgeon: Zach Craig MD;  Location:  GI       FAMILY HISTORY:   Family History   Problem Relation Age of Onset     Hypertension Mother      Alzheimer Disease Mother         dementia     Cancer Father 64        lymphoma cancer     Heart Disease Father         triple by-pass       SOCIAL HISTORY:   Social History     Tobacco Use     Smoking status: Never Smoker     Smokeless tobacco: Never Used   Substance Use Topics     Alcohol use: Yes     Comment: beer or wine socially occasionally       Current Outpatient Medications   Medication     amLODIPine (NORVASC) 5 MG tablet     ASPIRIN ADULT LOW STRENGTH PO     lisinopril (PRINIVIL/ZESTRIL) 20 MG tablet     No current facility-administered medications for this visit.      PHYSICAL EXAM:    There were no vitals taken for this visit.    Constitutional: Well developed. Conversant and in no acute  distress  Eyes: Anicteric sclera, conjunctiva clear, normal extraocular movements  ENT: Normocephalic and atraumatic,   Skin: Warm and dry. No rashes or lesions  Cardiac: No peripheral edema  Back/Flank: Not done  CNS/PNS: Normal musculature and movements, moves all extremities normally  Respiratory: Normal non-labored breathing  Abdomen: Soft nontender and nondistended  Peripheral Vascular: No peripheral edema  Mental Status/Psych: Alert and Oriented x 3. Normal mood and affect    Penis: Not done  Scrotal Skin: Not done  Testicles: Not done  Epididymis: Not done  Digital Rectal Exam: his prostate is moderately enlarged, benign and symmetric to palpation    Cystoscopy: Not done    Imaging: None    Urinalysis: UA RESULTS:  No results for input(s): COLOR, APPEARANCE, URINEGLC, URINEBILI, URINEKETONE, SG, UBLD, URINEPH, PROTEIN, UROBILINOGEN, NITRITE, LEUKEST, RBCU, WBCU in the last 13864 hours.    PSA: 14.8    Post Void Residual:     Other labs: None today      IMPRESSION:  Elevated PSA and enlarged prostate    PLAN:  His PSA has risen again over the past year. He has seen previous PSA jumps that have resolved spontaneously in the past.  His examination is unchanged. We discussed the risks for a potential prostate cancer. For now recommended we follow this a more closely. I recommended he come back in 6 months for another PSA and exam.    Chaparro Patterson M.D.

## 2019-04-11 ENCOUNTER — TRANSFERRED RECORDS (OUTPATIENT)
Dept: HEALTH INFORMATION MANAGEMENT | Facility: CLINIC | Age: 74
End: 2019-04-11

## 2019-04-13 DIAGNOSIS — I10 ESSENTIAL HYPERTENSION, BENIGN: ICD-10-CM

## 2019-04-13 NOTE — TELEPHONE ENCOUNTER
"Requested Prescriptions   Pending Prescriptions Disp Refills     amLODIPine (NORVASC) 5 MG tablet [Pharmacy Med Name: AMLODIPINE TAB 5MG] 90 tablet 2     Sig: TAKE 1 TABLET DAILY.   Last Written Prescription Date:  4/18/18  Last Fill Quantity: 90,  # refills: 2   Last Office Visit: 1/21/2019 [place]      Return in about 1 year (around 1/21/2020).     Future Office Visit:         Calcium Channel Blockers Protocol  Passed - 4/13/2019 12:52 AM        Passed - Blood pressure under 140/90 in past 12 months     BP Readings from Last 3 Encounters:   01/21/19 128/88   01/18/18 122/84   01/17/17 124/82                 Passed - Recent (12 mo) or future (30 days) visit within the authorizing provider's specialty         Patient had office visit in the last 12 months or has a visit in the next 30 days with authorizing provider or within the authorizing provider's specialty.  See \"Patient Info\" tab in inbasket, or \"Choose Columns\" in Meds & Orders section of the refill encounter.              Passed - Medication is active on med list        Passed - Patient is age 18 or older        Passed - Normal serum creatinine on file in past 12 months     Recent Labs   Lab Test 01/21/19  1026   CR 0.79             "

## 2019-04-15 DIAGNOSIS — I10 ESSENTIAL HYPERTENSION, BENIGN: ICD-10-CM

## 2019-04-15 RX ORDER — AMLODIPINE BESYLATE 5 MG/1
TABLET ORAL
Qty: 90 TABLET | Refills: 2 | Status: SHIPPED | OUTPATIENT
Start: 2019-04-15 | End: 2019-10-10

## 2019-04-15 NOTE — TELEPHONE ENCOUNTER
"Requested Prescriptions   Pending Prescriptions Disp Refills     lisinopril (PRINIVIL/ZESTRIL) 20 MG tablet 90 tablet 2     Sig: Take 1 tablet (20 mg) by mouth daily       ACE Inhibitors (Including Combos) Protocol Passed - 4/15/2019  1:42 PM        Passed - Blood pressure under 140/90 in past 12 months     BP Readings from Last 3 Encounters:   01/21/19 128/88   01/18/18 122/84   01/17/17 124/82                 Passed - Recent (12 mo) or future (30 days) visit within the authorizing provider's specialty     Patient had office visit in the last 12 months or has a visit in the next 30 days with authorizing provider or within the authorizing provider's specialty.  See \"Patient Info\" tab in inbasket, or \"Choose Columns\" in Meds & Orders section of the refill encounter.              Passed - Medication is active on med list        Passed - Patient is age 18 or older        Passed - Normal serum creatinine on file in past 12 months     Recent Labs   Lab Test 01/21/19  1026   CR 0.79             Passed - Normal serum potassium on file in past 12 months     Recent Labs   Lab Test 01/21/19  1026   POTASSIUM 4.3             Last Written Prescription Date:  05/30/18  Last Fill Quantity: 90,  # refills: 2   Last office visit: 1/21/2019 with prescribing provider:  Sylvester Sequeira     Future Office Visit:      "

## 2019-04-15 NOTE — TELEPHONE ENCOUNTER
Prescription approved per [facility] Refill Protocol.  Adina Pandey RN, BSN

## 2019-04-17 RX ORDER — LISINOPRIL 20 MG/1
20 TABLET ORAL DAILY
Qty: 90 TABLET | Refills: 2 | Status: SHIPPED | OUTPATIENT
Start: 2019-04-17 | End: 2019-10-10

## 2019-04-17 NOTE — TELEPHONE ENCOUNTER
Prescription approved per [facility] Refill Protocol.  Aditi Guzman RN, BSN

## 2019-07-19 ENCOUNTER — TELEPHONE (OUTPATIENT)
Dept: UROLOGY | Facility: CLINIC | Age: 74
End: 2019-07-19

## 2019-07-19 NOTE — TELEPHONE ENCOUNTER
Called patient and told him he would need to call [place] lab. Number given to patient to reschedule.     Antoinette Hallman LPN

## 2019-07-19 NOTE — TELEPHONE ENCOUNTER
[facility]    Phone Message    May a detailed message be left on voicemail: yes    Reason for Call: Other: Pt will be out of town when his PSA draw is scheduled and would like to schedule earlier.  Pt wanted to check with the clinic to make sure it was so early it would effect the results.  Please contact Pt to schedule blood draw for [place] for prior to 8/1      Action Taken: urology [place]

## 2019-07-23 DIAGNOSIS — R97.20 ELEVATED PROSTATE SPECIFIC ANTIGEN (PSA): ICD-10-CM

## 2019-07-23 LAB — PSA SERPL-MCNC: 12.2 UG/L (ref 0–4)

## 2019-07-23 PROCEDURE — 84153 ASSAY OF PSA TOTAL: CPT | Performed by: UROLOGY

## 2019-07-23 PROCEDURE — 36415 COLL VENOUS BLD VENIPUNCTURE: CPT | Performed by: UROLOGY

## 2019-08-05 ENCOUNTER — OFFICE VISIT (OUTPATIENT)
Dept: UROLOGY | Facility: CLINIC | Age: 74
End: 2019-08-05
Payer: MEDICARE

## 2019-08-05 VITALS — HEART RATE: 62 BPM | WEIGHT: 133 LBS | BODY MASS INDEX: 20.88 KG/M2 | OXYGEN SATURATION: 96 % | HEIGHT: 67 IN

## 2019-08-05 DIAGNOSIS — N40.0 ENLARGED PROSTATE: ICD-10-CM

## 2019-08-05 DIAGNOSIS — R97.20 ELEVATED PROSTATE SPECIFIC ANTIGEN (PSA): Primary | ICD-10-CM

## 2019-08-05 PROCEDURE — 99213 OFFICE O/P EST LOW 20 MIN: CPT | Performed by: UROLOGY

## 2019-08-05 ASSESSMENT — MIFFLIN-ST. JEOR: SCORE: 1306.91

## 2019-08-05 ASSESSMENT — PAIN SCALES - GENERAL: PAINLEVEL: NO PAIN (0)

## 2019-08-05 NOTE — LETTER
8/5/2019       RE: Paul Smith  [address] [facility] [address]     Dear Colleague,    Thank you for referring your patient, Paul Smith, to the [facility]OLOGY CLIN[facility] [place] [facility]. Please see a copy of my visit note below.    Office Visit Note  [facility] [place]  [phone]    UROLOGIC DIAGNOSES:   Elevated PSA and enlarged prostate    CURRENT INTERVENTIONS:   Negative prostate biopsies x2, negative MRI    HISTORY:   Chris returns to clinic today for follow-up on enlarged prostate and elevated PSA. His PSA has gone back down to previous levels, currently at 12.2. He has seen no changes in his urinary symptoms.      PAST MEDICAL HISTORY:   Past Medical History:   Diagnosis Date     Enlarged prostate     per pt reprt     Hypertension      PSA elevation        PAST SURGICAL HISTORY:   Past Surgical History:   Procedure Laterality Date     COLONOSCOPY  7/24/2013    Dr. Craig [facility]     COLONOSCOPY  7/24/2013    Procedure: COMBINED COLONOSCOPY, SINGLE BIOPSY/POLYPECTOMY BY BIOPSY;  Colonoscopy with polypectomy using cold forceps.;  Surgeon: Zach Craig MD;  Location:  GI       FAMILY HISTORY:   Family History   Problem Relation Age of Onset     Hypertension Mother      Alzheimer Disease Mother         dementia     Cancer Father 64        lymphoma cancer     Heart Disease Father         triple by-pass       SOCIAL HISTORY:   Social History     Tobacco Use     Smoking status: Never Smoker     Smokeless tobacco: Never Used   Substance Use Topics     Alcohol use: Yes     Comment: beer or wine socially occasionally       Current Outpatient Medications   Medication     amLODIPine (NORVASC) 5 MG tablet     amLODIPine (NORVASC) 5 MG tablet     ASPIRIN ADULT LOW STRENGTH PO     lisinopril (PRINIVIL/ZESTRIL) 20 MG tablet     No current facility-administered medications for this visit.          PHYSICAL EXAM:    There were no vitals taken  for this visit.    Constitutional: Well developed. Conversant and in no acute distress  Eyes: Anicteric sclera, conjunctiva clear, normal extraocular movements  ENT: Normocephalic and atraumatic,   Skin: Warm and dry. No rashes or lesions  Cardiac: No peripheral edema  Back/Flank: Not done  CNS/PNS: Normal musculature and movements, moves all extremities normally  Respiratory: Normal non-labored breathing  Abdomen: Soft nontender and nondistended  Peripheral Vascular: No peripheral edema  Mental Status/Psych: Alert and Oriented x 3. Normal mood and affect    Penis: Not done  Scrotal Skin: Not done  Testicles: Not done  Epididymis: Not done  Digital Rectal Exam: not repeated, was negative 6 months ago    Cystoscopy: Not done    Imaging: None    Urinalysis: UA RESULTS:  No results for input(s): COLOR, APPEARANCE, URINEGLC, URINEBILI, URINEKETONE, SG, UBLD, URINEPH, PROTEIN, UROBILINOGEN, NITRITE, LEUKEST, RBCU, WBCU in the last 30267 hours.    PSA: 12.2    Post Void Residual:     Other labs: None today      IMPRESSION:  Elevated PSA and enlarged prostate    PLAN:  His PSA has gone back down,it remains elevated but is stable over the past few years. I recommended that we ontinue to follow this on an annual basis. I will see him back in one year for another PSA and exam.      Chaparro Patterson M.D.

## 2019-08-05 NOTE — PROGRESS NOTES
Office Visit Note  [facility] Urology Clinic  [phone]    UROLOGIC DIAGNOSES:   Elevated PSA and enlarged prostate    CURRENT INTERVENTIONS:   Negative prostate biopsies x2, negative MRI    HISTORY:   Chris returns to clinic today for follow-up on enlarged prostate and elevated PSA. His PSA has gone back down to previous levels, currently at 12.2. He has seen no changes in his urinary symptoms.      PAST MEDICAL HISTORY:   Past Medical History:   Diagnosis Date     Enlarged prostate     per pt reprt     Hypertension      PSA elevation        PAST SURGICAL HISTORY:   Past Surgical History:   Procedure Laterality Date     COLONOSCOPY  7/24/2013    Dr. Craig [facility]     COLONOSCOPY  7/24/2013    Procedure: COMBINED COLONOSCOPY, SINGLE BIOPSY/POLYPECTOMY BY BIOPSY;  Colonoscopy with polypectomy using cold forceps.;  Surgeon: Zach Craig MD;  Location:  GI       FAMILY HISTORY:   Family History   Problem Relation Age of Onset     Hypertension Mother      Alzheimer Disease Mother         dementia     Cancer Father 64        lymphoma cancer     Heart Disease Father         triple by-pass       SOCIAL HISTORY:   Social History     Tobacco Use     Smoking status: Never Smoker     Smokeless tobacco: Never Used   Substance Use Topics     Alcohol use: Yes     Comment: beer or wine socially occasionally       Current Outpatient Medications   Medication     amLODIPine (NORVASC) 5 MG tablet     amLODIPine (NORVASC) 5 MG tablet     ASPIRIN ADULT LOW STRENGTH PO     lisinopril (PRINIVIL/ZESTRIL) 20 MG tablet     No current facility-administered medications for this visit.          PHYSICAL EXAM:    There were no vitals taken for this visit.    Constitutional: Well developed. Conversant and in no acute distress  Eyes: Anicteric sclera, conjunctiva clear, normal extraocular movements  ENT: Normocephalic and atraumatic,   Skin: Warm and dry. No rashes or lesions  Cardiac: No peripheral edema  Back/Flank: Not done  CNS/PNS: Normal  musculature and movements, moves all extremities normally  Respiratory: Normal non-labored breathing  Abdomen: Soft nontender and nondistended  Peripheral Vascular: No peripheral edema  Mental Status/Psych: Alert and Oriented x 3. Normal mood and affect    Penis: Not done  Scrotal Skin: Not done  Testicles: Not done  Epididymis: Not done  Digital Rectal Exam: not repeated, was negative 6 months ago    Cystoscopy: Not done    Imaging: None    Urinalysis: UA RESULTS:  No results for input(s): COLOR, APPEARANCE, URINEGLC, URINEBILI, URINEKETONE, SG, UBLD, URINEPH, PROTEIN, UROBILINOGEN, NITRITE, LEUKEST, RBCU, WBCU in the last 30541 hours.    PSA: 12.2    Post Void Residual:     Other labs: None today      IMPRESSION:  Elevated PSA and enlarged prostate    PLAN:  His PSA has gone back down,it remains elevated but is stable over the past few years. I recommended that we ontinue to follow this on an annual basis. I will see him back in one year for another PSA and exam.      Chaparro Patterson M.D.

## 2019-10-10 ENCOUNTER — MYC REFILL (OUTPATIENT)
Dept: FAMILY MEDICINE | Facility: CLINIC | Age: 74
End: 2019-10-10

## 2019-10-10 DIAGNOSIS — I10 ESSENTIAL HYPERTENSION, BENIGN: ICD-10-CM

## 2019-10-10 NOTE — TELEPHONE ENCOUNTER
"Requested Prescriptions   Pending Prescriptions Disp Refills     amLODIPine (NORVASC) 5 MG tablet  Last Written Prescription Date:  4/15/19  Last Fill Quantity: 90,  # refills: 2   Last Office Visit: 1/21/2019   Future Office Visit:      90 tablet 2     Sig: Take 1 tablet (5 mg) by mouth daily       Calcium Channel Blockers Protocol  Passed - 10/10/2019  9:20 AM        Passed - Blood pressure under 140/90 in past 12 months     BP Readings from Last 3 Encounters:   01/21/19 128/88   01/18/18 122/84   01/17/17 124/82           Passed - Recent (12 mo) or future (30 days) visit within the authorizing provider's specialty     Patient has had an office visit with the authorizing provider or a provider within the authorizing providers department within the previous 12 mos or has a future within next 30 days. See \"Patient Info\" tab in inbasket, or \"Choose Columns\" in Meds & Orders section of the refill encounter.            Passed - Medication is active on med list        Passed - Patient is age 18 or older        Passed - Normal serum creatinine on file in past 12 months     Recent Labs   Lab Test 01/21/19  1026   CR 0.79           lisinopril (PRINIVIL/ZESTRIL) 20 MG tablet  Last Written Prescription Date:  4/17/19  Last Fill Quantity: 90,  # refills: 2   Last Office Visit: 1/21/2019   Future Office Visit:      90 tablet 2     Sig: Take 1 tablet (20 mg) by mouth daily       ACE Inhibitors (Including Combos) Protocol Passed - 10/10/2019  9:20 AM        Passed - Blood pressure under 140/90 in past 12 months     BP Readings from Last 3 Encounters:   01/21/19 128/88   01/18/18 122/84   01/17/17 124/82           Passed - Recent (12 mo) or future (30 days) visit within the authorizing provider's specialty     Patient has had an office visit with the authorizing provider or a provider within the authorizing providers department within the previous 12 mos or has a future within next 30 days. See \"Patient Info\" tab in inbasket, or " "\"Choose Columns\" in Meds & Orders section of the refill encounter.              Passed - Medication is active on med list        Passed - Patient is age 18 or older        Passed - Normal serum creatinine on file in past 12 months     Recent Labs   Lab Test 01/21/19  1026   CR 0.79           Passed - Normal serum potassium on file in past 12 months     Recent Labs   Lab Test 01/21/19  1026   POTASSIUM 4.3             "

## 2019-10-11 RX ORDER — AMLODIPINE BESYLATE 5 MG/1
5 TABLET ORAL DAILY
Qty: 90 TABLET | Refills: 0 | Status: SHIPPED | OUTPATIENT
Start: 2019-10-11 | End: 2020-02-05

## 2019-10-11 RX ORDER — LISINOPRIL 20 MG/1
20 TABLET ORAL DAILY
Qty: 90 TABLET | Refills: 0 | Status: SHIPPED | OUTPATIENT
Start: 2019-10-11 | End: 2019-12-27

## 2019-10-11 NOTE — TELEPHONE ENCOUNTER
Has refills, sent mychart response  Prescription approved per [facility] Refill Protocol.  Adina Pandey RN, BSN

## 2019-10-28 ENCOUNTER — HEALTH MAINTENANCE LETTER (OUTPATIENT)
Age: 74
End: 2019-10-28

## 2019-11-01 ENCOUNTER — ALLIED HEALTH/NURSE VISIT (OUTPATIENT)
Dept: FAMILY MEDICINE | Facility: CLINIC | Age: 74
End: 2019-11-01
Payer: MEDICARE

## 2019-11-01 DIAGNOSIS — Z23 NEED FOR PROPHYLACTIC VACCINATION AND INOCULATION AGAINST INFLUENZA: Primary | ICD-10-CM

## 2019-11-01 PROCEDURE — 99207 ZZC NO CHARGE NURSE ONLY: CPT

## 2019-11-01 PROCEDURE — G0008 ADMIN INFLUENZA VIRUS VAC: HCPCS

## 2019-11-01 PROCEDURE — 90662 IIV NO PRSV INCREASED AG IM: CPT

## 2019-12-27 ENCOUNTER — MYC REFILL (OUTPATIENT)
Dept: FAMILY MEDICINE | Facility: CLINIC | Age: 74
End: 2019-12-27

## 2019-12-27 DIAGNOSIS — I10 ESSENTIAL HYPERTENSION, BENIGN: ICD-10-CM

## 2019-12-30 RX ORDER — LISINOPRIL 20 MG/1
20 TABLET ORAL DAILY
Qty: 90 TABLET | Refills: 0 | Status: SHIPPED | OUTPATIENT
Start: 2019-12-30 | End: 2020-02-05

## 2019-12-30 NOTE — TELEPHONE ENCOUNTER
"Prescription approved per [facility] Refill Protocol.  Requested Prescriptions   Pending Prescriptions Disp Refills     lisinopril (PRINIVIL/ZESTRIL) 20 MG tablet 90 tablet 0     Sig: Take 1 tablet (20 mg) by mouth daily       ACE Inhibitors (Including Combos) Protocol Passed - 12/27/2019  1:46 PM        Passed - Blood pressure under 140/90 in past 12 months     BP Readings from Last 3 Encounters:   01/21/19 128/88   01/18/18 122/84   01/17/17 124/82                 Passed - Recent (12 mo) or future (30 days) visit within the authorizing provider's specialty     Patient has had an office visit with the authorizing provider or a provider within the authorizing providers department within the previous 12 mos or has a future within next 30 days. See \"Patient Info\" tab in inbasket, or \"Choose Columns\" in Meds & Orders section of the refill encounter.              Passed - Medication is active on med list        Passed - Patient is age 18 or older        Passed - Normal serum creatinine on file in past 12 months     Recent Labs   Lab Test 01/21/19  1026   CR 0.79             Passed - Normal serum potassium on file in past 12 months     Recent Labs   Lab Test 01/21/19  1026   POTASSIUM 4.3             Soniya Shirley RN on 12/30/2019 at 11:05 AM    "

## 2020-01-13 ENCOUNTER — TRANSFERRED RECORDS (OUTPATIENT)
Dept: MULTI SPECIALTY CLINIC | Facility: CLINIC | Age: 75
End: 2020-01-13

## 2020-01-13 LAB — RETINOPATHY: NORMAL

## 2020-02-02 ASSESSMENT — ENCOUNTER SYMPTOMS
HEADACHES: 0
FEVER: 0
WEAKNESS: 0
JOINT SWELLING: 0
DIZZINESS: 0
SHORTNESS OF BREATH: 0
PARESTHESIAS: 0
SORE THROAT: 0
ABDOMINAL PAIN: 0
EYE PAIN: 0
CONSTIPATION: 0
HEMATOCHEZIA: 0
HEMATURIA: 0
FREQUENCY: 1
ARTHRALGIAS: 0
PALPITATIONS: 0
HEARTBURN: 0
NERVOUS/ANXIOUS: 0
DYSURIA: 0
NAUSEA: 0
COUGH: 0
CHILLS: 0
MYALGIAS: 0
DIARRHEA: 0

## 2020-02-02 ASSESSMENT — ACTIVITIES OF DAILY LIVING (ADL): CURRENT_FUNCTION: NO ASSISTANCE NEEDED

## 2020-02-03 NOTE — PROGRESS NOTES
"Pre-Visit Planning     Future Appointments   Date Time Provider Department Center   2/5/2020  8:30 AM Sylvester Sequeira MD [facility]   2/17/2020  8:45 AM Chaparro Patterson MD [facility]     Arrival Time for this Appointment:  8:05 AM   Appointment Notes for this encounter:   physical    Questionnaires Reviewed/Assigned  No additional questionnaires are needed      Patient preferred phone number: [phone]    Spoke to patient via phone. Patient does not have additional questions or concerns.        Visit is preventive. Reviewed purpose of preventive visit with patient.    Health Maintenance Due   Topic Date Due     ANNUAL REVIEW OF HM ORDERS  1945     ZOSTER IMMUNIZATION (1 of 2) 06/20/1996     AORTIC ANEURYSM SCREENING (SYSTEM ASSIGNED)  06/20/2011     FALL RISK ASSESSMENT  01/18/2019     MEDICARE ANNUAL WELLNESS VISIT  01/21/2020     CREATININE  01/21/2020     Patient is due for:        [employer]  Patient is active on [employer]    Questionnaire Review   Offered information on completing questionnaires via [employer]    Call Summary  \"Thank you for your time today.  If anything comes up before your appointment, please feel free to contact us at [phone].\"    "

## 2020-02-05 ENCOUNTER — OFFICE VISIT (OUTPATIENT)
Dept: FAMILY MEDICINE | Facility: CLINIC | Age: 75
End: 2020-02-05
Payer: MEDICARE

## 2020-02-05 DIAGNOSIS — R97.20 ELEVATED PROSTATE SPECIFIC ANTIGEN (PSA): ICD-10-CM

## 2020-02-05 DIAGNOSIS — Z00.00 ENCOUNTER FOR MEDICARE ANNUAL WELLNESS EXAM: Primary | ICD-10-CM

## 2020-02-05 DIAGNOSIS — Z12.5 SCREENING FOR PROSTATE CANCER: ICD-10-CM

## 2020-02-05 DIAGNOSIS — I10 ESSENTIAL HYPERTENSION, BENIGN: ICD-10-CM

## 2020-02-05 LAB
ALBUMIN SERPL-MCNC: 3.9 G/DL (ref 3.4–5)
ALP SERPL-CCNC: 88 U/L (ref 40–150)
ALT SERPL W P-5'-P-CCNC: 30 U/L (ref 0–70)
ANION GAP SERPL CALCULATED.3IONS-SCNC: 7 MMOL/L (ref 3–14)
AST SERPL W P-5'-P-CCNC: 28 U/L (ref 0–45)
BILIRUB SERPL-MCNC: 0.6 MG/DL (ref 0.2–1.3)
BUN SERPL-MCNC: 11 MG/DL (ref 7–30)
CALCIUM SERPL-MCNC: 9.4 MG/DL (ref 8.5–10.1)
CHLORIDE SERPL-SCNC: 102 MMOL/L (ref 94–109)
CHOLEST SERPL-MCNC: 192 MG/DL
CO2 SERPL-SCNC: 27 MMOL/L (ref 20–32)
CREAT SERPL-MCNC: 0.84 MG/DL (ref 0.66–1.25)
CREAT UR-MCNC: 180 MG/DL
GFR SERPL CREATININE-BSD FRML MDRD: 86 ML/MIN/{1.73_M2}
GLUCOSE SERPL-MCNC: 100 MG/DL (ref 70–99)
HDLC SERPL-MCNC: 53 MG/DL
LDLC SERPL CALC-MCNC: 123 MG/DL
MICROALBUMIN UR-MCNC: 70 MG/L
MICROALBUMIN/CREAT UR: 38.78 MG/G CR (ref 0–17)
NONHDLC SERPL-MCNC: 139 MG/DL
POTASSIUM SERPL-SCNC: 4.4 MMOL/L (ref 3.4–5.3)
PROT SERPL-MCNC: 7.4 G/DL (ref 6.8–8.8)
PSA SERPL-MCNC: 15.7 UG/L (ref 0–4)
SODIUM SERPL-SCNC: 136 MMOL/L (ref 133–144)
TRIGL SERPL-MCNC: 78 MG/DL

## 2020-02-05 PROCEDURE — 82043 UR ALBUMIN QUANTITATIVE: CPT | Performed by: FAMILY MEDICINE

## 2020-02-05 PROCEDURE — 84153 ASSAY OF PSA TOTAL: CPT | Performed by: FAMILY MEDICINE

## 2020-02-05 PROCEDURE — 80061 LIPID PANEL: CPT | Performed by: FAMILY MEDICINE

## 2020-02-05 PROCEDURE — G0439 PPPS, SUBSEQ VISIT: HCPCS | Performed by: FAMILY MEDICINE

## 2020-02-05 PROCEDURE — 80053 COMPREHEN METABOLIC PANEL: CPT | Performed by: FAMILY MEDICINE

## 2020-02-05 PROCEDURE — 36415 COLL VENOUS BLD VENIPUNCTURE: CPT | Performed by: FAMILY MEDICINE

## 2020-02-05 RX ORDER — LISINOPRIL 20 MG/1
20 TABLET ORAL DAILY
Qty: 90 TABLET | Refills: 3 | Status: SHIPPED | OUTPATIENT
Start: 2020-02-05 | End: 2021-02-08

## 2020-02-05 RX ORDER — AMLODIPINE BESYLATE 5 MG/1
5 TABLET ORAL DAILY
Qty: 90 TABLET | Refills: 3 | Status: SHIPPED | OUTPATIENT
Start: 2020-02-05 | End: 2021-02-08

## 2020-02-05 ASSESSMENT — ENCOUNTER SYMPTOMS
DYSURIA: 0
ARTHRALGIAS: 0
HEARTBURN: 0
DIARRHEA: 0
PARESTHESIAS: 0
HEADACHES: 0
CHILLS: 0
SHORTNESS OF BREATH: 0
ABDOMINAL PAIN: 0
COUGH: 0
JOINT SWELLING: 0
NERVOUS/ANXIOUS: 0
HEMATOCHEZIA: 0
NAUSEA: 0
MYALGIAS: 0
SORE THROAT: 0
CONSTIPATION: 0
WEAKNESS: 0
EYE PAIN: 0
HEMATURIA: 0
DIZZINESS: 0
FREQUENCY: 1
PALPITATIONS: 0
FEVER: 0

## 2020-02-05 ASSESSMENT — ACTIVITIES OF DAILY LIVING (ADL): CURRENT_FUNCTION: NO ASSISTANCE NEEDED

## 2020-02-05 NOTE — PROGRESS NOTES
"SUBJECTIVE:   Paul Smith is a 74 year old male who presents for Preventive Visit.  Are you in the first 12 months of your Medicare coverage?  No    Recent pain on left side pelvic insertion     Healthy Habits:     In general, how would you rate your overall health?  Excellent    Frequency of exercise:  6-7 days/week    Duration of exercise:  30-45 minutes    Do you usually eat at least 4 servings of fruit and vegetables a day, include whole grains    & fiber and avoid regularly eating high fat or \"junk\" foods?  Yes    Taking medications regularly:  Yes    Medication side effects:  None    Ability to successfully perform activities of daily living:  No assistance needed    Home Safety:  Throw rugs in the hallway and lack of grab bars in the bathroom    Hearing Impairment:  No hearing concerns    In the past 6 months, have you been bothered by leaking of urine?  No    In general, how would you rate your overall mental or emotional health?  Excellent      PHQ-2 Total Score: 0    Additional concerns today:  No    Do you feel safe in your environment? Yes    Have you ever done Advance Care Planning? (For example, a Health Directive, POLST, or a discussion with a medical provider or your loved ones about your wishes): Yes, advance care planning is on file.      Fall risk  Fallen 2 or more times in the past year?: No  Any fall with injury in the past year?: No    Cognitive Screening   1) Repeat 3 items (Leader, Season, Table)    2) Clock draw: NORMAL  3) 3 item recall: Recalls 3 objects  Results: NORMAL clock, 1-2 items recalled: COGNITIVE IMPAIRMENT LESS LIKELY    Mini-CogTM Copyright YANET Foster. Licensed by the author for use in [facility]; reprinted with permission (tim@.[facility]). All rights reserved.      Do you have sleep apnea, excessive snoring or daytime drowsiness?: no    Reviewed and updated as needed this visit by clinical staff  Tobacco  Allergies         Reviewed and updated as needed this " visit by Provider        Social History     Tobacco Use     Smoking status: Never Smoker     Smokeless tobacco: Never Used   Substance Use Topics     Alcohol use: Yes     Frequency: Monthly or less     Drinks per session: Patient refused     Binge frequency: Never     Comment: beer or wine socially occasionally         Alcohol Use 2/2/2020   Prescreen: >3 drinks/day or >7 drinks/week? Not Applicable   Prescreen: >3 drinks/day or >7 drinks/week? -           Current providers sharing in care for this patient include:   Patient Care Team:  Sylvester Sequeira MD as PCP - General (Family Practice)  Sylvester Sequeira MD as Assigned PCP  Chaparro Patterson MD as MD (Urology)    The following health maintenance items are reviewed in Epic and correct as of today:  Health Maintenance   Topic Date Due     ANNUAL REVIEW OF HM ORDERS  1945     ZOSTER IMMUNIZATION (1 of 2) 06/20/1996     AORTIC ANEURYSM SCREENING (SYSTEM ASSIGNED)  06/20/2011     FALL RISK ASSESSMENT  01/18/2019     MEDICARE ANNUAL WELLNESS VISIT  01/21/2020     CREATININE  01/21/2020     DTAP/TDAP/TD IMMUNIZATION (2 - Td) 02/01/2021     ADVANCE CARE PLANNING  07/22/2022     COLONOSCOPY  07/24/2023     LIPID  01/21/2024     HEPATITIS C SCREENING  Completed     PHQ-2  Completed     INFLUENZA VACCINE  Completed     PNEUMOCOCCAL IMMUNIZATION 65+ LOW/MEDIUM RISK  Completed     IPV IMMUNIZATION  Aged Out     MENINGITIS IMMUNIZATION  Aged Out     BP Readings from Last 3 Encounters:   01/21/19 128/88   01/18/18 122/84   01/17/17 124/82    Wt Readings from Last 3 Encounters:   08/05/19 60.3 kg (133 lb)   02/04/19 60.3 kg (133 lb)   01/21/19 60.6 kg (133 lb 8 oz)                  Pneumonia Vaccine:Adults age 65+ who received Pneumovax (PPSV23) at 65 years or older: Should be given PCV13 > 1 year after their most recent PPSV23    Review of Systems   Constitutional: Negative for chills and fever.   HENT: Negative for congestion, ear pain, hearing loss  "and sore throat.    Eyes: Negative for pain and visual disturbance.   Respiratory: Negative for cough and shortness of breath.    Cardiovascular: Negative for chest pain, palpitations and peripheral edema.   Gastrointestinal: Negative for abdominal pain, constipation, diarrhea, heartburn, hematochezia and nausea.   Genitourinary: Positive for frequency and urgency. Negative for discharge, dysuria, genital sores, hematuria and impotence.   Musculoskeletal: Negative for arthralgias, joint swelling and myalgias.   Skin: Negative for rash.   Neurological: Negative for dizziness, weakness, headaches and paresthesias.   Psychiatric/Behavioral: Negative for mood changes. The patient is not nervous/anxious.      Constitutional, HEENT, cardiovascular, pulmonary, GI, , musculoskeletal, neuro, skin, endocrine and psych systems are negative, except as otherwise noted.    OBJECTIVE:   There were no vitals taken for this visit. Estimated body mass index is 20.83 kg/m  as calculated from the following:    Height as of 8/5/19: 1.702 m (5' 7\").    Weight as of 8/5/19: 60.3 kg (133 lb).  Physical Exam  GENERAL: healthy, alert and no distress  EYES: Eyes grossly normal to inspection, PERRL and conjunctivae and sclerae normal  HENT: ear canals and TM's normal, nose and mouth without ulcers or lesions  NECK: no adenopathy, no asymmetry, masses, or scars and thyroid normal to palpation  RESP: lungs clear to auscultation - no rales, rhonchi or wheezes  CV: regular rate and rhythm, normal S1 S2, no S3 or S4, no murmur, click or rub, no peripheral edema and peripheral pulses strong  ABDOMEN: soft, nontender, no hepatosplenomegaly, no masses and bowel sounds normal  MS: no gross musculoskeletal defects noted, no edema  SKIN: no suspicious lesions or rashes  NEURO: Normal strength and tone, mentation intact and speech normal  PSYCH: mentation appears normal, affect normal/bright        ASSESSMENT / PLAN:   1. Encounter for Medicare annual " "wellness exam  He gets vigorous regular exercise       COUNSELING:  Reviewed preventive health counseling, as reflected in patient instructions       Regular exercise       Healthy diet/nutrition       Vision screening    Estimated body mass index is 20.83 kg/m  as calculated from the following:    Height as of 8/5/19: 1.702 m (5' 7\").    Weight as of 8/5/19: 60.3 kg (133 lb).         reports that he has never smoked. He has never used smokeless tobacco.      Appropriate preventive services were discussed with this patient, including applicable screening as appropriate for cardiovascular disease, diabetes, osteopenia/osteoporosis, and glaucoma.  As appropriate for age/gender, discussed screening for colorectal cancer, prostate cancer, breast cancer, and cervical cancer. Checklist reviewing preventive services available has been given to the patient.    Reviewed patients plan of care and provided an AVS. The Basic Care Plan (routine screening as documented in Health Maintenance) for Paul meets the Care Plan requirement. This Care Plan has been established and reviewed with the Patient.    Counseling Resources:  ATP IV Guidelines  Pooled Cohorts Equation Calculator  Breast Cancer Risk Calculator  FRAX Risk Assessment  ICSI Preventive Guidelines  Dietary Guidelines for Americans, 2010  [employer] MyPlate  ASA Prophylaxis  Lung CA Screening    Sylvester Sequeira MD  [facility]    Identified Health Risks:  "

## 2020-02-05 NOTE — PATIENT INSTRUCTIONS
Preventive Health Recommendations:     See your health care provider every year to    Review health changes.     Discuss preventive care.      Review your medicines if your doctor has prescribed any.      Talk with your health care provider about whether you should have a test to screen for prostate cancer (PSA).    Every 3 years, have a diabetes test (fasting glucose). If you are at risk for diabetes, you should have this test more often.    Every 5 years, have a cholesterol test. Have this test more often if you are at risk for high cholesterol or heart disease.     Every 10 years, have a colonoscopy. Or, have a yearly FIT test (stool test). These exams will check for colon cancer.    Talk to with your health care provider about screening for Abdominal Aortic Aneurysm if you have a family history of AAA or have a history of smoking.    Shots:     Get a flu shot each year.     Get a tetanus shot every 10 years.     Talk to your doctor about your pneumonia vaccines. There are now two you should receive - Pneumovax (PPSV 23) and Prevnar (PCV 13).     Talk to your pharmacist about a shingles vaccine.     Talk to your doctor about the hepatitis B vaccine.  Nutrition:     Eat at least 5 servings of fruits and vegetables each day.     Eat whole-grain bread, whole-wheat pasta and brown rice instead of white grains and rice.     Get adequate Calcium and Vitamin D.   Lifestyle    Exercise for at least 150 minutes a week (30 minutes a day, 5 days a week). This will help you control your weight and prevent disease.     Limit alcohol to one drink per day.     No smoking.     Wear sunscreen to prevent skin cancer.    See your dentist every six months for an exam and cleaning.    See your eye doctor every 1 to 2 years to screen for conditions such as glaucoma, macular degeneration, cataracts, etc.    Personalized Prevention Plan  You are due for the preventive services outlined below.  Your care team is available to assist you  in scheduling these services.  If you have already completed any of these items, please share that information with your care team to update in your medical record.  Health Maintenance Due   Topic Date Due     ANNUAL REVIEW OF HM ORDERS  1945     Zoster (Shingles) Vaccine (1 of 2) 06/20/1996     AORTIC ANEURYSM SCREENING (SYSTEM ASSIGNED)  06/20/2011     FALL RISK ASSESSMENT  01/18/2019     Annual Wellness Visit  01/21/2020     Creatinine Lab  01/21/2020     Patient Education   Personalized Prevention Plan  You are due for the preventive services outlined below.  Your care team is available to assist you in scheduling these services.  If you have already completed any of these items, please share that information with your care team to update in your medical record.  Health Maintenance Due   Topic Date Due     ANNUAL REVIEW OF HM ORDERS  1945     Zoster (Shingles) Vaccine (1 of 2) 06/20/1996     AORTIC ANEURYSM SCREENING (SYSTEM ASSIGNED)  06/20/2011     FALL RISK ASSESSMENT  01/18/2019     Annual Wellness Visit  01/21/2020     Creatinine Lab  01/21/2020

## 2020-02-20 ENCOUNTER — TRANSFERRED RECORDS (OUTPATIENT)
Dept: HEALTH INFORMATION MANAGEMENT | Facility: CLINIC | Age: 75
End: 2020-02-20

## 2021-01-13 ENCOUNTER — TRANSFERRED RECORDS (OUTPATIENT)
Dept: HEALTH INFORMATION MANAGEMENT | Facility: CLINIC | Age: 76
End: 2021-01-13

## 2021-02-08 ENCOUNTER — TELEPHONE (OUTPATIENT)
Dept: FAMILY MEDICINE | Facility: CLINIC | Age: 76
End: 2021-02-08

## 2021-02-08 ENCOUNTER — VIRTUAL VISIT (OUTPATIENT)
Dept: FAMILY MEDICINE | Facility: CLINIC | Age: 76
End: 2021-02-08
Payer: MEDICARE

## 2021-02-08 DIAGNOSIS — I10 ESSENTIAL HYPERTENSION, BENIGN: ICD-10-CM

## 2021-02-08 DIAGNOSIS — C61 PROSTATE CANCER (H): Primary | ICD-10-CM

## 2021-02-08 DIAGNOSIS — Z00.00 ENCOUNTER FOR MEDICARE ANNUAL WELLNESS EXAM: ICD-10-CM

## 2021-02-08 PROCEDURE — 99442 PR PHYSICIAN TELEPHONE EVALUATION 11-20 MIN: CPT | Mod: 95 | Performed by: FAMILY MEDICINE

## 2021-02-08 RX ORDER — AMLODIPINE BESYLATE 5 MG/1
5 TABLET ORAL DAILY
Qty: 90 TABLET | Refills: 3 | Status: SHIPPED | OUTPATIENT
Start: 2021-02-08 | End: 2022-01-21

## 2021-02-08 RX ORDER — LISINOPRIL 20 MG/1
20 TABLET ORAL DAILY
Qty: 90 TABLET | Refills: 3 | Status: SHIPPED | OUTPATIENT
Start: 2021-02-08 | End: 2022-01-21

## 2021-02-08 NOTE — PATIENT INSTRUCTIONS
Patient Education   Personalized Prevention Plan  You are due for the preventive services outlined below.  Your care team is available to assist you in scheduling these services.  If you have already completed any of these items, please share that information with your care team to update in your medical record.  Health Maintenance Due   Topic Date Due     ANNUAL REVIEW OF HM ORDERS  1945     Zoster (Shingles) Vaccine (1 of 2) 06/20/1996     AORTIC ANEURYSM SCREENING (SYSTEM ASSIGNED)  06/20/2011     FALL RISK ASSESSMENT  02/05/2021     Diptheria Tetanus Pertussis (DTAP/TDAP/TD) Vaccine (2 - Td) 02/01/2021     Creatinine Lab  02/05/2021

## 2021-02-08 NOTE — TELEPHONE ENCOUNTER
Called patient, left msg to call clinic. Need to schedule future appointments.  Return in about 6 months (around 8/8/2021) for Annual Wellness Visit, lab only visit, BP Recheck nurse only.   Ruth Behrens.

## 2021-02-08 NOTE — PROGRESS NOTES
Chris is a 75 year old who is being evaluated via a billable telephone visit.      What phone number would you like to be contacted at? [phone]  How would you like to obtain your AVS? Graciela      Subjective     Chris is a 75 year old who presents to clinic today for the following health issues 24 ,minutes times spemt [;is record keeping and plannning     SUBJECTIVE:    CC: Paul Smith is a 75 year old male who presents for follow up of his bloo pressures     HPI: feels greatl bp track is spot on         PROBLEM LIST:                 Patient Active Problem List:     CARDIOVASCULAR SCREENING; LDL GOAL LESS THAN 130     Elevated prostate specific antigen (PSA)     Hypertension, goal below 140/90     ACP (advance care planning)     Retina disorder, right     AK (actinic keratosis)     Sebaceous cyst      PAST MEDICAL HISTORY:                Past Medical History:  No date: Enlarged prostate      Comment:  per pt reprt  No date: Hypertension  No date: PSA elevation    PAST SURGICAL HISTORY:                Past Surgical History:  06/12/12: BIOPSY      Comment:  Prostate - High PSA - Benign  7/24/2013: COLONOSCOPY      Comment:  Dr. Craig [facility]  7/24/2013: COLONOSCOPY      Comment:  Procedure: COMBINED COLONOSCOPY, SINGLE                BIOPSY/POLYPECTOMY BY BIOPSY;  Colonoscopy with                polypectomy using cold forceps.;  Surgeon: Zach Craig MD;  Location:  GI    CURRENT MEDICATIONS:                Current Outpatient Medications:     amLODIPine (NORVASC) 5 MG tablet, Take 1 tablet (5 mg) by mouth daily, Disp: 90 tablet, Rfl: 3     lisinopril (ZESTRIL) 20 MG tablet, Take 1 tablet (20 mg) by mouth daily, Disp: 90 tablet, Rfl: 3     amLODIPine (NORVASC) 5 MG tablet, TAKE 1 TABLET DAILY., Disp: 90 tablet, Rfl: 2     ASPIRIN ADULT LOW STRENGTH PO, Take 81 mg by mouth daily., Disp: , Rfl:               FAMILY HISTORY:                 Review of patient's family history indicates:  Problem:  Hypertension      Relation: Mother          Age of Onset: (Not Specified)  Problem: Alzheimer Disease      Relation: Mother          Age of Onset: (Not Specified)          Comment: dementia  Problem: Cancer      Relation: Father          Age of Onset: 64          Comment: lymphoma cancer  Problem: Heart Disease      Relation: Father          Age of Onset: (Not Specified)          Comment: triple by-pass  Problem: Other Cancer      Relation: Father          Age of Onset: (Not Specified)          Comment: Lymphoma      HEALTH MAINTENANCE:                    REVIEW OF OUTSIDE RECORDS: YES - Date: Urology 2020    REVIEW OF SYSTEMS:  CONSTITUTIONAL: NEGATIVE for fever, chills  EYES: NEGATIVE for vision changes   RESP: NEGATIVE for significant cough or SOB  CV: NEGATIVE for chest pain, palpitations   GI: NEGATIVE for nausea, abdominal pain, heartburn, or change in bowel habits  : NEGATIVE for frequency, dysuria, or hematuria  MUSCULOSKELETAL: NEGATIVE for significant arthralgias or myalgia  NEURO: NEGATIVE for weakness, dizziness or paresthesias or headache  NVS:no headache or balance issus  INTEG:no moles or new rashes  LYMPH:no nodes or night sweats    EXAM:    There were no vitals taken for this visit.  GENERAL APPEARANCE: healthy, alert and no distress   EXAM:  GENERAL APPEARANCE: healthy, alert and no distress  EYES: EOMI,  PERRL  HENT: ear canals and TM's normal and nose and mouth without ulcers or lesions  RESP: lungs clear to auscultation - no rales, rhonchi or wheezes  CV: regular rates and rhythm, normal S1 S2, no S3 or S4 and no murmur, click or rub -  ABDOMEN:  soft, nontender, no HSM or masses and bowel sounds normal  BACK:no tenderness or pain on straight let raise           ASSESSMENT/PLAN  BENIGN HYPERTENSION         REVIEW OF SYSTEMS    Generally has been  feeling well until this episode. No problems with vision, hearing, dental or neck pain.Has  airborne or ingestion allergy  No chest pain,  "palpitations, dyspnea, change in bowel habits, blood  in stool or dyspepsia.  No rashes, changing moles, weakness, lassitude or back problems.  No chronic issues . No dysuria  Patient  a smoker. No problems with significant headaches.                         (I10) BENIGN HYPERTENSION  Comment:   Plan: amLODIPine (NORVASC) 5 MG tablet, lisinopril         (ZESTRIL) 20 MG tablet          Associated Prostate Cancer   Patient Active Problem List:     CARDIOVASCULAR SCREENING; LDL GOAL LESS THAN 130     Elevated prostate specific antigen (PSA)     Hypertension, goal below 140/90     ACP (advance care planning)     Retina disorder, right     AK (actinic keratosis)     Sebaceous cyst      Six month follow up with lab work                                     Hypertension Follow-up      Do you check your blood pressure regularly outside of the clinic? Yes     Are you following a low salt diet? Yes    Are your blood pressures ever more than 140 on the top number (systolic) OR more   than 90 on the bottom number (diastolic), for example 140/90? No        Annual Wellness Visit    Patient has been advised of split billing requirements and indicates understanding: Yes     Are you in the first 12 months of your Medicare Part B coverage?  No    Physical Health:    In general, how would you rate your overall physical health? excellent    Outside of work, how many days during the week do you exercise?6-7 days/week    Outside of work, approximately how many minutes a day do you exercise?45-60 minutes    If you drink alcohol do you typically have >3 drinks per day or >7 drinks per week? No    Do you usually eat at least 4 servings of fruit and vegetables a day, include whole grains & fiber and avoid regularly eating high fat or \"junk\" foods? Yes    Do you have any problems taking medications regularly? No    Do you have any side effects from medications? none    Needs assistance for the following daily activities: no assistance " "needed    Which of the following safety concerns are present in your home?  none identified     Hearing impairment: No    In the past 6 months, have you been bothered by leaking of urine? no    There were no vitals taken for this visit.  Weight: Provided by patient  Height: Provided by patient  BMI: Based on patient-provided information  Blood Pressure: Provided by patient    Mental Health:    In general, how would you rate your overall mental or emotional health? excellent  PHQ-2 Score: 0    Do you feel safe in your environment? Yes    Have you ever done Advance Care Planning? (For example, a Health Directive, POLST, or a discussion with a medical provider or your loved ones about your wishes)? Yes, advance care planning is on file.    Fall risk:  Fallen 2 or more times in the past year?: No  Any fall with injury in the past year?: No    Cognitive Screening: Unable to complete due to virtual visit; need for additional assessment in future face-to-face visit    Do you have sleep apnea, excessive snoring or daytime drowsiness?: no    Current providers sharing in care for this patient include:   Patient Care Team:  Sylvester Sequeira MD as PCP - General (Family Practice)  Sylvester Sequeira MD as Assigned PCP  Chaparro Patterson MD as MD (Urology)  Chaparro Patterson MD as Assigned Surgical Provider    Patient has been advised of split billing requirements and indicates understanding:       Review of Systems   Constitutional, HEENT, cardiovascular, pulmonary, GI, , musculoskeletal, neuro, skin, endocrine and psych systems are negative, except as otherwise noted.      Objective    Vitals - Patient Reported  Systolic (Patient Reported): 125  Diastolic (Patient Reported): 70  Weight (Patient Reported): 59.4 kg (131 lb)  Height (Patient Reported): 172.7 cm (5' 8\")  BMI (Based on Pt Reported Ht/Wt): 19.92        Physical Exam   healthy, alert and no distress  PSYCH: Alert and oriented times 3; " "coherent speech, normal   rate and volume, able to articulate logical thoughts, able   to abstract reason, no tangential thoughts, no hallucinations   or delusions  His affect is normal  RESP: No cough, no audible wheezing, able to talk in full sentences  Remainder of exam unable to be completed due to telephone visits    Knowledgeable about his meds             Phone call duration: 24 minutes  SUBJECTIVE:   Paul Smith is a 75 year old male who presents for Preventive Visit.      Patient has been advised of split billing requirements and indicates understanding: Are you in the first 12 months of your Medicare Part B coverage?  No    Physical Health:    In general, how would you rate your overall physical health? good    Outside of work, how many days during the week do you exercise? 6-7 days/week    Outside of work, approximately how many minutes a day do you exercise?30-45 minutes  If you drink alcohol do you typically have >3 drinks per day or >7 drinks per week? Yes - AUDIT SCORE:       o you usually eat at least 4 servings of fruit and vegetables a day, include whole grains & fiber and avoid regularly eating high fat or \"junk\" foods? Yes    Do you have any problems taking medications regularly?  No    Do you have any side effects from medications? none    Needs assistance for the following daily activities: no assistance needed    Which of the following safety concerns are present in your home?  none identified     Hearing impairment: No    In the past 6 months, have you been bothered by leaking of urine? no    Mental Health:    In general, how would you rate your overall mental or emotional health? good  PHQ-2 Score: 0    Do you feel safe in your environment? Yes    Have you ever done Advance Care Planning? (For example, a Health Directive, POLST, or a discussion with a medical provider or your loved ones about your wishes): Yes, advance care planning is on file.          Fall risk:  No additional fall risk "     Cognitive Screening:     Do you have sleep apnea, excessive snoring or daytime drowsiness?: no            Reviewed and updated as needed this visit by clinical staff  Tobacco  Allergies  Meds   Med Hx  Surg Hx  Fam Hx  Soc Hx        Reviewed and updated as needed this visit by Provider    Meds             Social History     Tobacco Use     Smoking status: Never Smoker     Smokeless tobacco: Never Used   Substance Use Topics     Alcohol use: Yes     Frequency: Monthly or less     Drinks per session: Patient refused     Binge frequency: Never     Comment: beer or wine socially occasionally                           Current providers sharing in care for this patient include:   Patient Care Team:  Sylvester Sequeira MD as PCP - General (Family Practice)  Sylvester Sequeira MD as Assigned PCP  Chaparro Patterson MD as MD (Urology)  Chaparro Patterson MD as Assigned Surgical Provider    The following health maintenance items are reviewed in Epic and correct as of today:  Health Maintenance   Topic Date Due     ANNUAL REVIEW OF HM ORDERS  1945     ZOSTER IMMUNIZATION (1 of 2) 06/20/1996     AORTIC ANEURYSM SCREENING (SYSTEM ASSIGNED)  06/20/2011     FALL RISK ASSESSMENT  02/05/2021     DTAP/TDAP/TD IMMUNIZATION (2 - Td) 02/01/2021     CREATININE  02/05/2021     MEDICARE ANNUAL WELLNESS VISIT  02/08/2022     COLORECTAL CANCER SCREENING  07/24/2023     LIPID  02/05/2025     ADVANCE CARE PLANNING  02/08/2026     HEPATITIS C SCREENING  Completed     PHQ-2  Completed     INFLUENZA VACCINE  Completed     Pneumococcal Vaccine: 65+ Years  Completed     Pneumococcal Vaccine: Pediatrics (0 to 5 Years) and At-Risk Patients (6 to 64 Years)  Aged Out     IPV IMMUNIZATION  Aged Out     MENINGITIS IMMUNIZATION  Aged Out     HEPATITIS B IMMUNIZATION  Aged Out     BP Readings from Last 3 Encounters:   01/21/19 128/88   01/18/18 122/84   01/17/17 124/82    Wt Readings from Last 3 Encounters:   08/05/19  "60.3 kg (133 lb)   02/04/19 60.3 kg (133 lb)   01/21/19 60.6 kg (133 lb 8 oz)                      ROS:  Constitutional, HEENT, cardiovascular, pulmonary, GI, , musculoskeletal, neuro, skin, endocrine and psych systems are negative, except as otherwise noted.    OBJECTIVE:   There were no vitals taken for this visit. Estimated body mass index is 20.83 kg/m  as calculated from the following:    Height as of 8/5/19: 1.702 m (5' 7\").    Weight as of 8/5/19: 60.3 kg (133 lb).  EXAM:   GENERAL: healthy, alert and no distress  EYES: Eyes grossly normal to inspection, PERRL and conjunctivae and sclerae normal  HENT: ear canals and TM's normal, nose and mouth without ulcers or lesions  NECK: no adenopathy, no asymmetry, masses, or scars and thyroid normal to palpation  RESP: lungs clear to auscultation - no rales, rhonchi or wheezes  CV: regular rate and rhythm, normal S1 S2, no S3 or S4, no murmur, click or rub, no peripheral edema and peripheral pulses strong  ABDOMEN: soft, nontender, no hepatosplenomegaly, no masses and bowel sounds normal  MS: no gross musculoskeletal defects noted, no edema  SKIN: no suspicious lesions or rashes  NEURO: Normal strength and tone, mentation intact and speech normal  PSYCH: mentation appears normal, affect normal/bright        ASSESSMENT / PLAN:   Stable medical conditionw    Patient has been advised of split billing requirements and indicates understanding:     COUNSELING:  Reviewed preventive health counseling, as reflected in patient instructions       Regular exercise       Healthy diet/nutrition    Estimated body mass index is 20.83 kg/m  as calculated from the following:    Height as of 8/5/19: 1.702 m (5' 7\").    Weight as of 8/5/19: 60.3 kg (133 lb).        He reports that he has never smoked. He has never used smokeless tobacco.    Appropriate preventive services were discussed with this patient, including applicable screening as appropriate for cardiovascular disease, " diabetes, osteopenia/osteoporosis, and glaucoma.  As appropriate for age/gender, discussed screening for colorectal cancer, prostate cancer, breast cancer, and cervical cancer. Checklist reviewing preventive services available has been given to the patient.    Reviewed patients plan of care and provided an AVS. The Basic Care Plan (routine screening as documented in Health Maintenance) for Paul meets the Care Plan requirement. This Care Plan has been established and reviewed with the Patient.    Counseling Resources:  ATP IV Guidelines  Pooled Cohorts Equation Calculator  Breast Cancer Risk Calculator  BRCA-Related Cancer Risk Assessment: FHS-7 Tool  FRAX Risk Assessment  ICSI Preventive Guidelines  Dietary Guidelines for Americans, 2010  USDA's MyPlate  ASA Prophylaxis  Lung CA Screening    Sylvester Sequeira MD  [facility]

## 2021-03-03 ENCOUNTER — IMMUNIZATION (OUTPATIENT)
Dept: NURSING | Facility: CLINIC | Age: 76
End: 2021-03-03
Payer: MEDICARE

## 2021-03-03 PROCEDURE — 91301 PR COVID VAC MODERNA 100 MCG/0.5 ML IM: CPT

## 2021-03-03 PROCEDURE — 0011A PR COVID VAC MODERNA 100 MCG/0.5 ML IM: CPT

## 2021-03-31 ENCOUNTER — IMMUNIZATION (OUTPATIENT)
Dept: NURSING | Facility: CLINIC | Age: 76
End: 2021-03-31
Attending: INTERNAL MEDICINE
Payer: MEDICARE

## 2021-03-31 PROCEDURE — 91301 PR COVID VAC MODERNA 100 MCG/0.5 ML IM: CPT

## 2021-03-31 PROCEDURE — 0012A PR COVID VAC MODERNA 100 MCG/0.5 ML IM: CPT

## 2021-08-17 ENCOUNTER — ANCILLARY PROCEDURE (OUTPATIENT)
Dept: GENERAL RADIOLOGY | Facility: CLINIC | Age: 76
End: 2021-08-17
Attending: PHYSICIAN ASSISTANT
Payer: MEDICARE

## 2021-08-17 ENCOUNTER — OFFICE VISIT (OUTPATIENT)
Dept: URGENT CARE | Facility: URGENT CARE | Age: 76
End: 2021-08-17
Payer: MEDICARE

## 2021-08-17 VITALS
HEART RATE: 73 BPM | SYSTOLIC BLOOD PRESSURE: 110 MMHG | OXYGEN SATURATION: 99 % | WEIGHT: 128 LBS | BODY MASS INDEX: 20.05 KG/M2 | RESPIRATION RATE: 16 BRPM | TEMPERATURE: 98.2 F | DIASTOLIC BLOOD PRESSURE: 80 MMHG

## 2021-08-17 DIAGNOSIS — M25.551 HIP PAIN, RIGHT: ICD-10-CM

## 2021-08-17 DIAGNOSIS — M25.551 HIP PAIN, RIGHT: Primary | ICD-10-CM

## 2021-08-17 PROCEDURE — 73502 X-RAY EXAM HIP UNI 2-3 VIEWS: CPT | Mod: RT | Performed by: RADIOLOGY

## 2021-08-17 PROCEDURE — 99214 OFFICE O/P EST MOD 30 MIN: CPT | Performed by: PHYSICIAN ASSISTANT

## 2021-08-17 NOTE — PATIENT INSTRUCTIONS
1) Take Ibuprofen 600 mg 3-4 times daily with food as needed for pain. If this is not sufficient for pain control it may be combined with Tylenol up to 1000 mg four times a day.   2) Ice to the affected area 20 minutes three times daily.  3) Follow up in 1-2 weeks days if not improving, sooner if worsening.
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
## 2021-08-24 ENCOUNTER — OFFICE VISIT (OUTPATIENT)
Dept: ORTHOPEDICS | Facility: CLINIC | Age: 76
End: 2021-08-24
Attending: PHYSICIAN ASSISTANT
Payer: MEDICARE

## 2021-08-24 VITALS
WEIGHT: 128 LBS | DIASTOLIC BLOOD PRESSURE: 84 MMHG | BODY MASS INDEX: 20.09 KG/M2 | HEIGHT: 67 IN | SYSTOLIC BLOOD PRESSURE: 138 MMHG

## 2021-08-24 DIAGNOSIS — M25.551 HIP PAIN, RIGHT: ICD-10-CM

## 2021-08-24 PROCEDURE — 99204 OFFICE O/P NEW MOD 45 MIN: CPT | Performed by: STUDENT IN AN ORGANIZED HEALTH CARE EDUCATION/TRAINING PROGRAM

## 2021-08-24 ASSESSMENT — HOOS S4: HOW SEVERE IS YOUR HIP JOINT STIFFNESS AFTER FIRST WAKENING IN THE MORNING?: EXTREME

## 2021-08-24 ASSESSMENT — ACTIVITIES OF DAILY LIVING (ADL)
ADL_SUBSCALE_SCORE: 29.41
ADL_SUM: 48
ADL_MEAN: 2.82

## 2021-08-24 ASSESSMENT — MIFFLIN-ST. JEOR: SCORE: 1274.23

## 2021-08-24 NOTE — PROGRESS NOTES
"    [facility]  Orthopaedic Surgery Consultation by Yovanny Guan M.D.    Paul Smith MRN# [id number]   Age: 76 year old YOB: 1945     Requesting physician: Sylvester Page     Background history:  DX:  1. Hypertension  2. BPH    TREATMENTS:  1. None           History of Present Illness:   76 year old male who presents her clinic because of chronic right hip pain.  This pain has been present for approximately 5 weeks without any antecedent trauma.  The pain is experienced in the buttock region.  Patient does not endorse any lower back or groin pain.  The pain does not seem to radiate.  Patient denies the presence of any motor or sensory deficits.  He endorses significant night pain.  He also states that he has trouble putting on his socks and shoes.  No clear initiation stiffness or soreness.  When walking up stairs he can no longer alternate his feet.  To mitigate the pain he has used NSAIDs with reasonable effect.  He has not seen a physical therapist nor has had any injections.    Social:   Occupation: Retired  Living situation: lives alone  Hobbies / Sports: biking, spending time at his cabin    Smoking: No  Alcohol: No  Illicit drug use: No         Physical Exam:     EXAMINATION pertinent findings:   PSYCH: Pleasant, healthy-appearing, alert, oriented x3, cooperative. Normal mood and affect.  VITAL SIGNS: Blood pressure 138/84, height 1.702 m (5' 7\"), weight 58.1 kg (128 lb).  Reviewed nursing intake notes.   Body mass index is 20.05 kg/m .  RESP: non labored breathing   ABD: benign, soft, non-tender, no acute peritoneal findings  SKIN: grossly normal   LYMPHATIC: grossly normal, no adenopathy, no extremity edema  NEURO: grossly normal , no motor deficits  VASCULAR: satisfactory perfusion of all extremities   MUSCULOSKELETAL:   Alignment: Neutral  Gait: Normal   R hip: ROM    , Extension 0  , IRF 20  , ERF 40  , ABD 30  , ADD 20 .  Deep flexion " and rotations are not painful.  Lasegue's test is negative.  No tenderness to palpation over the greater trochanteric region.  Resistance testing of hip flexors, abductors and external rotators is nonpainful.  Tenderness to palpation over the sciatic region.    Right LE:   Thigh and leg compartments soft and compressible   +Quad/TA/GSC/FHL/EHL   SILT DP/SP/Js/Saph/Tib nerve distributions   Palpable dorsalis pedis pulse              Data:   All laboratory data reviewed  All imaging studies reviewed by me personally.    XR hip right 8/17/2021:  My interpretation: Minimal degenerative changes of right hip with some joint space narrowing.  Presence of subchondral cysts.         Assessment and Plan:   Assessment:  76-year-old male with pain of right hip/buttock of unclear etiology.  Differential diagnosis includes osteoarthritic changes of the right hip or lumbar spine pathology.     Plan:  I extensively discussed my findings with the patient.  In order to differentiate between a hip or lumbar spine etiology it would be my recommendation to proceed with an intra-articular injection of the right hip under ultrasound or fluoroscopic guidance under combination of lidocaine and cortisone.  Patient was instructed to maintain a pain diary especially in the first hours after injection.  Patient understands and agrees to the treatment plan set forth.  We will follow-up approximately 2 weeks after the injection has been placed.    Thank you for your referral.      Yovanny Guan MD, PhD     Adult [facility] Department of Orthopaedic Surgery  Pager [phone]      DATA for DOCUMENTATION:         Past Medical History:     Patient Active Problem List   Diagnosis     CARDIOVASCULAR SCREENING; LDL GOAL LESS THAN 130     Elevated prostate specific antigen (PSA)     Hypertension, goal below 140/90     ACP (advance care planning)     Retina disorder, right     AK (actinic keratosis)      Sebaceous cyst     Past Medical History:   Diagnosis Date     Enlarged prostate     per pt reprt     Hypertension      PSA elevation        Also see scanned health assessment forms.       Past Surgical History:     Past Surgical History:   Procedure Laterality Date     BIOPSY  06/12/12    Prostate - High PSA - Benign     COLONOSCOPY  7/24/2013    Dr. Craig [facility]     COLONOSCOPY  7/24/2013    Procedure: COMBINED COLONOSCOPY, SINGLE BIOPSY/POLYPECTOMY BY BIOPSY;  Colonoscopy with polypectomy using cold forceps.;  Surgeon: Zach Craig MD;  Location:  GI            Social History:     Social History     Socioeconomic History     Marital status: Single     Spouse name: Not on file     Number of children: Not on file     Years of education: Not on file     Highest education level: Some college, no degree   Occupational History     Not on file   Tobacco Use     Smoking status: Never Smoker     Smokeless tobacco: Never Used   Substance and Sexual Activity     Alcohol use: Yes     Comment: beer or wine socially occasionally     Drug use: No     Sexual activity: Never   Other Topics Concern     Parent/sibling w/ CABG, MI or angioplasty before 65F 55M? No   Social History Narrative     Not on file     Social Determinants of Health     Financial Resource Strain: Low Risk      Difficulty of Paying Living Expenses: Not hard at all   Food Insecurity: No Food Insecurity     Worried About Running Out of Food in the Last Year: Never true     Ran Out of Food in the Last Year: Never true   Transportation Needs: No Transportation Needs     Lack of Transportation (Medical): No     Lack of Transportation (Non-Medical): No   Physical Activity:      Days of Exercise per Week:      Minutes of Exercise per Session:    Stress:      Feeling of Stress :    Social Connections:      Frequency of Communication with Friends and Family:      Frequency of Social Gatherings with Friends and Family:      Attends Christian Services:      Active  Member of Clubs or Organizations:      Attends Club or Organization Meetings:      Marital Status:    Intimate Partner Violence:      Fear of Current or Ex-Partner:      Emotionally Abused:      Physically Abused:      Sexually Abused:             Family History:       Family History   Problem Relation Age of Onset     Hypertension Mother      Alzheimer Disease Mother         dementia     Cancer Father 64        lymphoma cancer     Heart Disease Father         triple by-pass     Other Cancer Father         Lymphoma            Medications:     Current Outpatient Medications   Medication Sig     amLODIPine (NORVASC) 5 MG tablet Take 1 tablet (5 mg) by mouth daily     amLODIPine (NORVASC) 5 MG tablet TAKE 1 TABLET DAILY.     ASPIRIN ADULT LOW STRENGTH PO Take 81 mg by mouth daily.     lisinopril (ZESTRIL) 20 MG tablet Take 1 tablet (20 mg) by mouth daily     No current facility-administered medications for this visit.              Review of Systems:   A comprehensive 10 point review of systems (constitutional, ENT, cardiac, peripheral vascular, lymphatic, respiratory, GI, , Musculoskeletal, skin, Neurological) was performed and found to be negative except as described in this note.     See intake form completed by patient

## 2021-08-24 NOTE — PATIENT INSTRUCTIONS
1. Hip pain, right      Referral for Ultrasound guided right hip injection - [phone]    Follow up with Dr. Guan 2 weeks after your injection.    Call my office with any questions or concerns, [phone].

## 2021-08-24 NOTE — LETTER
"    8/24/2021         RE: Paul Smith  [address] [address]        Dear Colleague,    Thank you for referring your patient, Paul Smith, to the [facility] [place]. Please see a copy of my visit note below.        [facility]  Orthopaedic Surgery Consultation by Yovanny Guan M.D.    Paul Smith MRN# [id number]   Age: 76 year old YOB: 1945     Requesting physician: Sylvester Page     Background history:  DX:  1. Hypertension  2. BPH    TREATMENTS:  1. None           History of Present Illness:   76 year old male who presents her clinic because of chronic right hip pain.  This pain has been present for approximately 5 weeks without any antecedent trauma.  The pain is experienced in the buttock region.  Patient does not endorse any lower back or groin pain.  The pain does not seem to radiate.  Patient denies the presence of any motor or sensory deficits.  He endorses significant night pain.  He also states that he has trouble putting on his socks and shoes.  No clear initiation stiffness or soreness.  When walking up stairs he can no longer alternate his feet.  To mitigate the pain he has used NSAIDs with reasonable effect.  He has not seen a physical therapist nor has had any injections.    Social:   Occupation: Retired  Living situation: lives alone  Hobbies / Sports: biking, spending time at his cabin    Smoking: No  Alcohol: No  Illicit drug use: No         Physical Exam:     EXAMINATION pertinent findings:   PSYCH: Pleasant, healthy-appearing, alert, oriented x3, cooperative. Normal mood and affect.  VITAL SIGNS: Blood pressure 138/84, height 1.702 m (5' 7\"), weight 58.1 kg (128 lb).  Reviewed nursing intake notes.   Body mass index is 20.05 kg/m .  RESP: non labored breathing   ABD: benign, soft, non-tender, no acute peritoneal findings  SKIN: grossly normal   LYMPHATIC: grossly normal, no adenopathy, " no extremity edema  NEURO: grossly normal , no motor deficits  VASCULAR: satisfactory perfusion of all extremities   MUSCULOSKELETAL:   Alignment: Neutral  Gait: Normal   R hip: ROM    , Extension 0  , IRF 20  , ERF 40  , ABD 30  , ADD 20 .  Deep flexion and rotations are not painful.  Lasegue's test is negative.  No tenderness to palpation over the greater trochanteric region.  Resistance testing of hip flexors, abductors and external rotators is nonpainful.  Tenderness to palpation over the sciatic region.    Right LE:   Thigh and leg compartments soft and compressible   +Quad/TA/GSC/FHL/EHL   SILT DP/SP/Js/Saph/Tib nerve distributions   Palpable dorsalis pedis pulse              Data:   All laboratory data reviewed  All imaging studies reviewed by me personally.    XR hip right 8/17/2021:  My interpretation: Minimal degenerative changes of right hip with some joint space narrowing.  Presence of subchondral cysts.         Assessment and Plan:   Assessment:  76-year-old male with pain of right hip/buttock of unclear etiology.  Differential diagnosis includes osteoarthritic changes of the right hip or lumbar spine pathology.     Plan:  I extensively discussed my findings with the patient.  In order to differentiate between a hip or lumbar spine etiology it would be my recommendation to proceed with an intra-articular injection of the right hip under ultrasound or fluoroscopic guidance under combination of lidocaine and cortisone.  Patient was instructed to maintain a pain diary especially in the first hours after injection.  Patient understands and agrees to the treatment plan set forth.  We will follow-up approximately 2 weeks after the injection has been placed.    Thank you for your referral.      Yovanny Guan MD, PhD     Adult [facility]  [facility] Department of Orthopaedic Surgery  Pager [phone]      DATA for DOCUMENTATION:         Past Medical History:      Patient Active Problem List   Diagnosis     CARDIOVASCULAR SCREENING; LDL GOAL LESS THAN 130     Elevated prostate specific antigen (PSA)     Hypertension, goal below 140/90     ACP (advance care planning)     Retina disorder, right     AK (actinic keratosis)     Sebaceous cyst     Past Medical History:   Diagnosis Date     Enlarged prostate     per pt reprt     Hypertension      PSA elevation        Also see scanned health assessment forms.       Past Surgical History:     Past Surgical History:   Procedure Laterality Date     BIOPSY  06/12/12    Prostate - High PSA - Benign     COLONOSCOPY  7/24/2013    Dr. Craig [facility] license of [facility]     COLONOSCOPY  7/24/2013    Procedure: COMBINED COLONOSCOPY, SINGLE BIOPSY/POLYPECTOMY BY BIOPSY;  Colonoscopy with polypectomy using cold forceps.;  Surgeon: Zach Craig MD;  Location:  GI            Social History:     Social History     Socioeconomic History     Marital status: Single     Spouse name: Not on file     Number of children: Not on file     Years of education: Not on file     Highest education level: Some college, no degree   Occupational History     Not on file   Tobacco Use     Smoking status: Never Smoker     Smokeless tobacco: Never Used   Substance and Sexual Activity     Alcohol use: Yes     Comment: beer or wine socially occasionally     Drug use: No     Sexual activity: Never   Other Topics Concern     Parent/sibling w/ CABG, MI or angioplasty before 65F 55M? No   Social History Narrative     Not on file     Social Determinants of Health     Financial Resource Strain: Low Risk      Difficulty of Paying Living Expenses: Not hard at all   Food Insecurity: No Food Insecurity     Worried About Running Out of Food in the Last Year: Never true     Ran Out of Food in the Last Year: Never true   Transportation Needs: No Transportation Needs     Lack of Transportation (Medical): No     Lack of Transportation (Non-Medical): No   Physical Activity:      Days of Exercise per  Week:      Minutes of Exercise per Session:    Stress:      Feeling of Stress :    Social Connections:      Frequency of Communication with Friends and Family:      Frequency of Social Gatherings with Friends and Family:      Attends Yarsanism Services:      Active Member of Clubs or Organizations:      Attends Club or Organization Meetings:      Marital Status:    Intimate Partner Violence:      Fear of Current or Ex-Partner:      Emotionally Abused:      Physically Abused:      Sexually Abused:             Family History:       Family History   Problem Relation Age of Onset     Hypertension Mother      Alzheimer Disease Mother         dementia     Cancer Father 64        lymphoma cancer     Heart Disease Father         triple by-pass     Other Cancer Father         Lymphoma            Medications:     Current Outpatient Medications   Medication Sig     amLODIPine (NORVASC) 5 MG tablet Take 1 tablet (5 mg) by mouth daily     amLODIPine (NORVASC) 5 MG tablet TAKE 1 TABLET DAILY.     ASPIRIN ADULT LOW STRENGTH PO Take 81 mg by mouth daily.     lisinopril (ZESTRIL) 20 MG tablet Take 1 tablet (20 mg) by mouth daily     No current facility-administered medications for this visit.              Review of Systems:   A comprehensive 10 point review of systems (constitutional, ENT, cardiac, peripheral vascular, lymphatic, respiratory, GI, , Musculoskeletal, skin, Neurological) was performed and found to be negative except as described in this note.     See intake form completed by patient          Again, thank you for allowing me to participate in the care of your patient.        Sincerely,        Yovanny Guan MD

## 2021-10-14 ENCOUNTER — MYC MEDICAL ADVICE (OUTPATIENT)
Dept: FAMILY MEDICINE | Facility: CLINIC | Age: 76
End: 2021-10-14

## 2021-12-02 ENCOUNTER — TRANSFERRED RECORDS (OUTPATIENT)
Dept: HEALTH INFORMATION MANAGEMENT | Facility: CLINIC | Age: 76
End: 2021-12-02
Payer: MEDICARE

## 2021-12-07 ENCOUNTER — IMMUNIZATION (OUTPATIENT)
Dept: FAMILY MEDICINE | Facility: CLINIC | Age: 76
End: 2021-12-07
Payer: MEDICARE

## 2021-12-07 DIAGNOSIS — Z23 HIGH PRIORITY FOR 2019-NCOV VACCINE: Primary | ICD-10-CM

## 2021-12-07 PROCEDURE — 99207 PR NO CHARGE LOS: CPT

## 2021-12-07 PROCEDURE — 0064A COVID-19,PF,MODERNA (18+ YRS BOOSTER .25ML): CPT

## 2021-12-07 PROCEDURE — 91306 COVID-19,PF,MODERNA (18+ YRS BOOSTER .25ML): CPT

## 2022-01-21 DIAGNOSIS — I10 ESSENTIAL HYPERTENSION, BENIGN: ICD-10-CM

## 2022-01-21 RX ORDER — AMLODIPINE BESYLATE 5 MG/1
TABLET ORAL
Qty: 90 TABLET | Refills: 0 | Status: SHIPPED | OUTPATIENT
Start: 2022-01-21 | End: 2022-03-23

## 2022-01-21 RX ORDER — LISINOPRIL 20 MG/1
TABLET ORAL
Qty: 90 TABLET | Refills: 0 | Status: SHIPPED | OUTPATIENT
Start: 2022-01-21 | End: 2022-03-23

## 2022-01-21 NOTE — TELEPHONE ENCOUNTER
Routing refill request to provider for review/approval because:  Labs not current: creatinine, potassium    Visit is up to date. RN will issue one time 90 day anitha refill. Please advise on labs.   Thom GREY RN

## 2022-01-28 NOTE — TELEPHONE ENCOUNTER
Reason for Call:  Other returning call    Detailed comments: FYI Patient will call back to schedule on [employer].    Phone Number Patient can be reached at: na  Best Time: na    Can we leave a detailed message on this number? Not Applicable    Call taken on 1/28/2022 at 12:10 PM by Estefania Storm

## 2022-03-23 ENCOUNTER — VIRTUAL VISIT (OUTPATIENT)
Dept: FAMILY MEDICINE | Facility: CLINIC | Age: 77
End: 2022-03-23
Payer: MEDICARE

## 2022-03-23 DIAGNOSIS — N40.1 BENIGN PROSTATIC HYPERPLASIA WITH NOCTURIA: ICD-10-CM

## 2022-03-23 DIAGNOSIS — R35.1 BENIGN PROSTATIC HYPERPLASIA WITH NOCTURIA: ICD-10-CM

## 2022-03-23 DIAGNOSIS — I10 ESSENTIAL HYPERTENSION, BENIGN: Primary | ICD-10-CM

## 2022-03-23 DIAGNOSIS — R97.20 ELEVATED PROSTATE SPECIFIC ANTIGEN (PSA): ICD-10-CM

## 2022-03-23 DIAGNOSIS — Z12.5 SPECIAL SCREENING FOR MALIGNANT NEOPLASM OF PROSTATE: ICD-10-CM

## 2022-03-23 DIAGNOSIS — F41.9 ANXIETY: ICD-10-CM

## 2022-03-23 PROBLEM — C61 PROSTATE CANCER (H): Status: RESOLVED | Noted: 2022-03-23 | Resolved: 2022-03-23

## 2022-03-23 PROBLEM — C61 PROSTATE CANCER (H): Status: ACTIVE | Noted: 2022-03-23

## 2022-03-23 PROCEDURE — 99214 OFFICE O/P EST MOD 30 MIN: CPT | Mod: 95 | Performed by: FAMILY MEDICINE

## 2022-03-23 RX ORDER — LISINOPRIL 20 MG/1
20 TABLET ORAL DAILY
Qty: 90 TABLET | Refills: 0 | Status: SHIPPED | OUTPATIENT
Start: 2022-03-23 | End: 2022-07-07

## 2022-03-23 RX ORDER — AMLODIPINE BESYLATE 5 MG/1
5 TABLET ORAL DAILY
Qty: 90 TABLET | Refills: 0 | Status: SHIPPED | OUTPATIENT
Start: 2022-03-23 | End: 2023-01-09

## 2022-03-23 NOTE — PROGRESS NOTES
"Chris is a 76 year old who is being evaluated via a billable telephone visit.      What phone number would you like to be contacted at? [phone]  How would you like to obtain your AVS? [facility]    Assessment & Plan   Problem List Items Addressed This Visit     Anxiety     Patient reports that he has been avoiding human contact during the pandemic, ruminates on \"exploding cases\" in [place].  Patient is immunized and boosted.  Discussed current clinic precautions.  Last face-to-face visit here 2 years ago.  Since, urgent care, ophthalmology, orthopedics.  Meds filled 90 days.  Needs lab, BP check.  Will not refill again without data         BENIGN HYPERTENSION - Primary     Controlled last summer.  Continue current therapies.  Needs measure         Relevant Medications    lisinopril (ZESTRIL) 20 MG tablet    amLODIPine (NORVASC) 5 MG tablet    Other Relevant Orders    Comprehensive metabolic panel (BMP + Alb, Alk Phos, ALT, AST, Total. Bili, TP)    Benign prostatic hyperplasia with nocturia     Multiple biopsies remotely.  Cancer not present.  Nocturia x2.  Needs reassessment         Elevated prostate specific antigen (PSA)     Dr. Patterson was following, last visit 2 1/2  years ago.   PSA   Date Value Ref Range Status   02/05/2020 15.70 (H) 0 - 4 ug/L Final     Comment:     Assay Method:  Chemiluminescence using Siemens Vista analyzer     Needs reassessment         Special screening for malignant neoplasm of prostate     He is due         Relevant Orders    PSA, screen                        Return for BP Recheck, Lab Work only.    Won Menjivar MD  [facility]    Subjective   Chris is a 76 year old who presents for the following health issues     History of Present Illness       Reason for visit:  Annual Checkup  Symptoms include:  None  What makes it worse:  None  What makes it better:  None    He eats 4 or more servings of fruits and vegetables daily.He consumes 0 sweetened beverage(s) " "daily.He exercises with enough effort to increase his heart rate 30 to 60 minutes per day.  He exercises with enough effort to increase his heart rate 7 days per week.   He is taking medications regularly.       Hypertension Follow-up      Do you check your blood pressure regularly outside of the clinic? Yes     Are you following a low salt diet? Yes    Are your blood pressures ever more than 140 on the top number (systolic) OR more   than 90 on the bottom number (diastolic), for example 140/90? Yes        Review of Systems   Patient feels well.  Except for nocturia, no other complaints      Objective    Vitals - Patient Reported  Systolic (Patient Reported): 130  Diastolic (Patient Reported): 72  Weight (Patient Reported): 58.5 kg (129 lb)  Height (Patient Reported): 170.2 cm (5' 7\")  BMI (Based on Pt Reported Ht/Wt): 20.2      Vitals:  No vitals were obtained today due to virtual visit.    Physical Exam   healthy, alert and no distress  PSYCH: Alert and oriented times 3; coherent speech, normal   rate and volume, able to articulate logical thoughts, able   to abstract reason, no tangential thoughts, no hallucinations   or delusions  His affect is normal  RESP: No cough, no audible wheezing, able to talk in full sentences  Remainder of exam unable to be completed due to telephone visits                Phone call duration: 12 minutes  Won Menjivar MD    "

## 2022-03-24 NOTE — ASSESSMENT & PLAN NOTE
Dr. Patterson was following, last visit 2 1/2  years ago.   PSA   Date Value Ref Range Status   02/05/2020 15.70 (H) 0 - 4 ug/L Final     Comment:     Assay Method:  Chemiluminescence using Siemens Vista analyzer     Needs reassessment

## 2022-03-24 NOTE — ASSESSMENT & PLAN NOTE
"Patient reports that he has been avoiding human contact during the pandemic, ruminates on \"exploding cases\" in [place].  Patient is immunized and boosted.  Discussed current clinic precautions.  Last face-to-face visit here 2 years ago.  Since, urgent care, ophthalmology, orthopedics.  Meds filled 90 days.  Needs lab, BP check.  Will not refill again without data  "

## 2022-03-25 NOTE — PROGRESS NOTES
ANAHI for pt to return call  Emily Hernández/  Answers for HPI/ROS submitted by the patient on 3/16/2022  How many servings of fruits and vegetables do you eat daily?: 4 or more  On average, how many sweetened beverages do you drink each day (Examples: soda, juice, sweet tea, etc.  Do NOT count diet or artificially sweetened beverages)?: 0  How many minutes a day do you exercise enough to make your heart beat faster?: 30 to 60  How many days a week do you exercise enough to make your heart beat faster?: 7  How many days per week do you miss taking your medication?: 0  What is the reason for your visit today?: Annual Checkup  What are your symptoms?: None  Is there anything that makes you feel worse?: None  Is there anything that makes you feel better?: None

## 2022-03-25 NOTE — PROGRESS NOTES
Patient informed  Emily Hernández/  Answers for HPI/ROS submitted by the patient on 3/16/2022  How many servings of fruits and vegetables do you eat daily?: 4 or more  On average, how many sweetened beverages do you drink each day (Examples: soda, juice, sweet tea, etc.  Do NOT count diet or artificially sweetened beverages)?: 0  How many minutes a day do you exercise enough to make your heart beat faster?: 30 to 60  How many days a week do you exercise enough to make your heart beat faster?: 7  How many days per week do you miss taking your medication?: 0  What is the reason for your visit today?: Annual Checkup  What are your symptoms?: None  Is there anything that makes you feel worse?: None  Is there anything that makes you feel better?: None       Valentine Hammonds), Internal Medicine; Nephrology  [address] [place] [address]  Phone: [phone]  Fax: [phone]    Angelo Garcia), Surgery  [address] [place] [address]  Phone: [phone]  Fax: [phone]

## 2022-04-10 ENCOUNTER — HEALTH MAINTENANCE LETTER (OUTPATIENT)
Age: 77
End: 2022-04-10

## 2022-06-30 ENCOUNTER — TRANSFERRED RECORDS (OUTPATIENT)
Dept: FAMILY MEDICINE | Facility: CLINIC | Age: 77
End: 2022-06-30

## 2022-07-04 DIAGNOSIS — I10 ESSENTIAL HYPERTENSION, BENIGN: ICD-10-CM

## 2022-07-07 DIAGNOSIS — R97.20 ELEVATED PROSTATE SPECIFIC ANTIGEN (PSA): ICD-10-CM

## 2022-07-07 DIAGNOSIS — I10 ESSENTIAL HYPERTENSION, BENIGN: ICD-10-CM

## 2022-07-07 DIAGNOSIS — Z12.5 ENCOUNTER FOR SCREENING FOR MALIGNANT NEOPLASM OF PROSTATE: ICD-10-CM

## 2022-07-07 DIAGNOSIS — Z00.00 ENCOUNTER FOR PREVENTIVE CARE: Primary | ICD-10-CM

## 2022-07-07 RX ORDER — LISINOPRIL 20 MG/1
TABLET ORAL
Qty: 90 TABLET | Refills: 0 | Status: SHIPPED | OUTPATIENT
Start: 2022-07-07 | End: 2022-09-26

## 2022-07-07 NOTE — TELEPHONE ENCOUNTER
Routing request to provider for review/approval because:  Labs not current: creatinine, potassium    Visit is up to date. RN will issue one time 90 day anitha refill. Please advise on labs.   Thom GREY RN

## 2022-07-12 ENCOUNTER — TELEPHONE (OUTPATIENT)
Dept: FAMILY MEDICINE | Facility: CLINIC | Age: 77
End: 2022-07-12

## 2022-07-12 NOTE — TELEPHONE ENCOUNTER
Patient Quality Outreach    Patient is due for the following:   n/a    NEXT STEPS:   No follow up needed at this time.    Type of outreach:    pt had an eye exam at [facility] on 6/30/22      Questions for provider review:    None     Danielle Badillo, CMA

## 2022-09-25 DIAGNOSIS — I10 ESSENTIAL HYPERTENSION, BENIGN: ICD-10-CM

## 2022-09-25 NOTE — LETTER
September 26, 2022      Paul Smith  [address] [address]      Dear Paul,    We recently received a call from your pharmacy requesting a refill of your medication.    A review of your chart indicates that an appointment is required with your provider.  Please call the clinic to schedule your appointment.    We have authorized one refill of your medication to allow time for you to schedule.   If you have a history of diabetes or high cholesterol, please come in fasting for the appointment. Fasting entails nothing to eat or drink 8 hours prior to your appointment; with the exception on water. You may take your medication the day of the appointment.    Thank you,      [facility]

## 2022-09-26 RX ORDER — LISINOPRIL 20 MG/1
TABLET ORAL
Qty: 90 TABLET | Refills: 0 | Status: SHIPPED | OUTPATIENT
Start: 2022-09-26 | End: 2023-01-09

## 2022-09-26 NOTE — TELEPHONE ENCOUNTER
Are you taking over script? Routing refill request to provider for review/approval because:  Labs not current:  creatinine, potassium    Thom GREY RN

## 2022-09-26 NOTE — TELEPHONE ENCOUNTER
Routing to [facility] to assist patient in scheduling a visit     Anitha VAENDANO RN   Patient Advocate Liaison (PAL)  [facility] [place]

## 2022-11-12 ENCOUNTER — IMMUNIZATION (OUTPATIENT)
Dept: FAMILY MEDICINE | Facility: CLINIC | Age: 77
End: 2022-11-12
Payer: MEDICARE

## 2022-11-12 DIAGNOSIS — Z23 HIGH PRIORITY FOR 2019-NCOV VACCINE: Primary | ICD-10-CM

## 2022-11-12 PROCEDURE — 0134A COVID-19,PF,MODERNA BIVALENT: CPT

## 2022-11-12 PROCEDURE — 91313 COVID-19,PF,MODERNA BIVALENT: CPT

## 2022-12-22 ENCOUNTER — PATIENT OUTREACH (OUTPATIENT)
Dept: FAMILY MEDICINE | Facility: CLINIC | Age: 77
End: 2022-12-22

## 2022-12-22 NOTE — LETTER
Paul Smith  [address] [address]    Paul Perez,    Thank you for choosing [facility] today for your health care needs.     [facility] is transforming primary care  At [facility], we re dedicated to constantly improve how we serve the health care needs of our patients and communities. We re currently making changes to the way we deliver care.     Changes you ll notice include:    An emphasis on building a relationship with a primary care provider    Access to a PAL (patient advocate and liaison) to help guide you with your care needs    Appointment lengths tailored to your specific needs and greater access to a care team to help you and your provider improve and maintain your health and well-being    Improved online access to your care team    Benefits of a primary care provider  If you don t have a designated primary care provider, we encourage you to get to know our care team online and find a provider you d like to see. Most of our providers have a short video on their online provider page. Visit [email or web address] to explore our providers and locations.    Benefits of having a primary care provider include:      They get to know you - your health history, family history and goals, making it easier to make a health plan together.     You get to know them - making health-related conversations and decisions easier      Primary care doctors help you when you re sick or hurt - but also focus on keeping you healthy with preventive care and screenings.      A doctor who sees you regularly is more likely to notice changes in your health.     You ll be connected to a broad care team who partners with your provider to support you.    Patient Advocate Liaison (PAL)   To help make sure you get the right care, at the right time, we include PALs, or Patient Advocate Liaisons, as part of your care team. Your PAL will be your first line of contact. They ll advocate for your needs and help you  navigate our services, connecting you with care team members and community resources to ensure your care is well coordinated. You ll be introduced to a PAL in an upcoming visit.     Expanded care team access with tailored appointment lengths  Depending on your health care needs, you may have longer or shorter appointments and see additional care team providers - including Medication Therapy Management (MTM) pharmacists, diabetes educators, behavioral health clinicians, or social workers. At times, they may be included in your visit with your provider, or you may see them individually.     Online access to your health care records and care team  [employer] is our online tool that makes it easy to see your health care information and communicate with your care team.     [employer] allows you to:     View your health maintenance plan so you know when you re due for a preventive screening    Send secure messages to your care team    View your health history and visit summaries     Schedule appointments     Complete questionnaires and eCheck-in before appointments      Get care from your provider with an e-visit      View and pay your bill     Sign up at [employer]. Once you have an account, you also can download the mobile tiff.     Connecting to fast and convenient care  When you need fast, convenient care - consider one of the following options:       Video Visit: A convenient care option for visiting with your provider out of the comfort of your own home. Most of the things you come to the clinic to address with your provider can now be done virtually through a video. This includes your chronic medication follow up, questions or concerns you may have, and even your annual Medicare Wellness Visit.       Phone Visit: Another convenient option for follow up of common problems that may require a more in-depth discussion with your provider.       E-visit: When you need acute care quickly, or have a quick question about  your medication, an E-visit is completed through [employer] and your provider will respond within one business day.                Antonina MARROQUIN RN  Patient Advocate Ramone - PAL RN  [facility]  [phone]

## 2022-12-22 NOTE — TELEPHONE ENCOUNTER
SB 3 PAL Welcome Letter Sent    Antonina MARROQUIN RN   Patient Advocate Ramone RHODES RN  [facility]  [phone]

## 2023-01-02 ENCOUNTER — LAB (OUTPATIENT)
Dept: LAB | Facility: CLINIC | Age: 78
End: 2023-01-02
Payer: MEDICARE

## 2023-01-02 ENCOUNTER — TELEPHONE (OUTPATIENT)
Dept: UROLOGY | Facility: CLINIC | Age: 78
End: 2023-01-02

## 2023-01-02 DIAGNOSIS — R30.0 DYSURIA: Primary | ICD-10-CM

## 2023-01-02 DIAGNOSIS — R30.0 DYSURIA: ICD-10-CM

## 2023-01-02 LAB
ALBUMIN UR-MCNC: NEGATIVE MG/DL
APPEARANCE UR: CLEAR
BILIRUB UR QL STRIP: NEGATIVE
COLOR UR AUTO: YELLOW
GLUCOSE UR STRIP-MCNC: NEGATIVE MG/DL
HGB UR QL STRIP: NEGATIVE
KETONES UR STRIP-MCNC: NEGATIVE MG/DL
LEUKOCYTE ESTERASE UR QL STRIP: NEGATIVE
NITRATE UR QL: NEGATIVE
PH UR STRIP: 6.5 [PH] (ref 5–7)
SP GR UR STRIP: 1.02 (ref 1–1.03)
UROBILINOGEN UR STRIP-ACNC: 0.2 E.U./DL

## 2023-01-02 PROCEDURE — 87086 URINE CULTURE/COLONY COUNT: CPT

## 2023-01-02 PROCEDURE — 81003 URINALYSIS AUTO W/O SCOPE: CPT | Mod: QW

## 2023-01-02 NOTE — TELEPHONE ENCOUNTER
[facility] Call Center    Phone Message    May a detailed message be left on voicemail: yes     Reason for Call: Symptoms or Concerns     If patient has red-flag symptoms, warm transfer to triage line    Current symptom or concern: pain while urinating , only urinating 1-2 tblsp each time     Symptoms have been present for:  1 week(s)    Has patient previously been seen for this? No      Are there any new or worsening symptoms? No      Action Taken: Message routed to:  Other: Uro    Travel Screening: Not Applicable

## 2023-01-02 NOTE — TELEPHONE ENCOUNTER
Orders placed for UA/UC,will go a [facility],hasn't been seen since 2019,needs follow up.  Eva Olivares LPN

## 2023-01-04 LAB — BACTERIA UR CULT: NO GROWTH

## 2023-01-06 ENCOUNTER — OFFICE VISIT (OUTPATIENT)
Dept: UROLOGY | Facility: CLINIC | Age: 78
End: 2023-01-06
Payer: MEDICARE

## 2023-01-06 ENCOUNTER — APPOINTMENT (OUTPATIENT)
Dept: ULTRASOUND IMAGING | Facility: CLINIC | Age: 78
End: 2023-01-06
Attending: EMERGENCY MEDICINE
Payer: MEDICARE

## 2023-01-06 ENCOUNTER — HOSPITAL ENCOUNTER (EMERGENCY)
Facility: CLINIC | Age: 78
Discharge: HOME | End: 2023-01-06
Attending: EMERGENCY MEDICINE | Admitting: EMERGENCY MEDICINE
Payer: MEDICARE

## 2023-01-06 VITALS
DIASTOLIC BLOOD PRESSURE: 82 MMHG | SYSTOLIC BLOOD PRESSURE: 128 MMHG | BODY MASS INDEX: 20.09 KG/M2 | HEIGHT: 67 IN | WEIGHT: 128 LBS

## 2023-01-06 VITALS
RESPIRATION RATE: 19 BRPM | SYSTOLIC BLOOD PRESSURE: 144 MMHG | TEMPERATURE: 97.1 F | DIASTOLIC BLOOD PRESSURE: 98 MMHG | OXYGEN SATURATION: 99 % | HEART RATE: 67 BPM

## 2023-01-06 DIAGNOSIS — N40.1 BENIGN PROSTATIC HYPERPLASIA (BPH) WITH STRAINING ON URINATION: ICD-10-CM

## 2023-01-06 DIAGNOSIS — R60.0 BILATERAL LOWER EXTREMITY EDEMA: ICD-10-CM

## 2023-01-06 DIAGNOSIS — R39.16 BENIGN PROSTATIC HYPERPLASIA (BPH) WITH STRAINING ON URINATION: ICD-10-CM

## 2023-01-06 DIAGNOSIS — R97.20 ELEVATED PROSTATE SPECIFIC ANTIGEN (PSA): ICD-10-CM

## 2023-01-06 DIAGNOSIS — Z79.2 PROPHYLACTIC ANTIBIOTIC: ICD-10-CM

## 2023-01-06 DIAGNOSIS — R33.9 URINARY RETENTION: Primary | ICD-10-CM

## 2023-01-06 DIAGNOSIS — R33.9 URINARY RETENTION: ICD-10-CM

## 2023-01-06 LAB
ALBUMIN SERPL BCG-MCNC: 4.5 G/DL (ref 3.5–5.2)
ALBUMIN UR-MCNC: 10 MG/DL
ALBUMIN UR-MCNC: NEGATIVE MG/DL
ALP SERPL-CCNC: 93 U/L (ref 40–129)
ALT SERPL W P-5'-P-CCNC: 72 U/L (ref 10–50)
AMORPH CRY #/AREA URNS HPF: ABNORMAL /HPF
ANION GAP SERPL CALCULATED.3IONS-SCNC: 9 MMOL/L (ref 7–15)
APPEARANCE UR: ABNORMAL
APPEARANCE UR: CLEAR
AST SERPL W P-5'-P-CCNC: 103 U/L (ref 10–50)
BACTERIA #/AREA URNS HPF: ABNORMAL /HPF
BASOPHILS # BLD AUTO: 0.1 10E3/UL (ref 0–0.2)
BASOPHILS NFR BLD AUTO: 1 %
BILIRUB SERPL-MCNC: 0.7 MG/DL
BILIRUB UR QL STRIP: NEGATIVE
BILIRUB UR QL STRIP: NEGATIVE
BUN SERPL-MCNC: 17.2 MG/DL (ref 8–23)
CALCIUM SERPL-MCNC: 9.2 MG/DL (ref 8.8–10.2)
CHLORIDE SERPL-SCNC: 102 MMOL/L (ref 98–107)
COLOR UR AUTO: ABNORMAL
COLOR UR AUTO: YELLOW
CREAT SERPL-MCNC: 0.6 MG/DL (ref 0.67–1.17)
DEPRECATED HCO3 PLAS-SCNC: 29 MMOL/L (ref 22–29)
EOSINOPHIL # BLD AUTO: 0.1 10E3/UL (ref 0–0.7)
EOSINOPHIL NFR BLD AUTO: 1 %
ERYTHROCYTE [DISTWIDTH] IN BLOOD BY AUTOMATED COUNT: 13.6 % (ref 10–15)
GFR SERPL CREATININE-BSD FRML MDRD: >90 ML/MIN/1.73M2
GLUCOSE SERPL-MCNC: 111 MG/DL (ref 70–99)
GLUCOSE UR STRIP-MCNC: NEGATIVE MG/DL
GLUCOSE UR STRIP-MCNC: NEGATIVE MG/DL
HCT VFR BLD AUTO: 39.4 % (ref 40–53)
HGB BLD-MCNC: 12.6 G/DL (ref 13.3–17.7)
HGB UR QL STRIP: ABNORMAL
HGB UR QL STRIP: NEGATIVE
IMM GRANULOCYTES # BLD: 0 10E3/UL
IMM GRANULOCYTES NFR BLD: 0 %
KETONES UR STRIP-MCNC: NEGATIVE MG/DL
KETONES UR STRIP-MCNC: NEGATIVE MG/DL
LEUKOCYTE ESTERASE UR QL STRIP: NEGATIVE
LEUKOCYTE ESTERASE UR QL STRIP: NEGATIVE
LYMPHOCYTES # BLD AUTO: 0.9 10E3/UL (ref 0.8–5.3)
LYMPHOCYTES NFR BLD AUTO: 10 %
MCH RBC QN AUTO: 28.4 PG (ref 26.5–33)
MCHC RBC AUTO-ENTMCNC: 32 G/DL (ref 31.5–36.5)
MCV RBC AUTO: 89 FL (ref 78–100)
MONOCYTES # BLD AUTO: 0.5 10E3/UL (ref 0–1.3)
MONOCYTES NFR BLD AUTO: 6 %
MUCOUS THREADS #/AREA URNS LPF: PRESENT /LPF
NEUTROPHILS # BLD AUTO: 7.7 10E3/UL (ref 1.6–8.3)
NEUTROPHILS NFR BLD AUTO: 82 %
NITRATE UR QL: NEGATIVE
NITRATE UR QL: NEGATIVE
NRBC # BLD AUTO: 0 10E3/UL
NRBC BLD AUTO-RTO: 0 /100
PH UR STRIP: 7 [PH] (ref 5–7)
PH UR STRIP: 8 [PH] (ref 5–7)
PLATELET # BLD AUTO: 237 10E3/UL (ref 150–450)
POTASSIUM SERPL-SCNC: 3.7 MMOL/L (ref 3.4–5.3)
PROT SERPL-MCNC: 7.4 G/DL (ref 6.4–8.3)
RBC # BLD AUTO: 4.43 10E6/UL (ref 4.4–5.9)
RBC URINE: 37 /HPF
RESIDUAL VOLUME (RV) (EXTERNAL): 999
SODIUM SERPL-SCNC: 140 MMOL/L (ref 136–145)
SP GR UR STRIP: 1.01 (ref 1–1.03)
SP GR UR STRIP: 1.02 (ref 1–1.03)
SQUAMOUS EPITHELIAL: <1 /HPF
UROBILINOGEN UR STRIP-ACNC: 0.2 E.U./DL
UROBILINOGEN UR STRIP-MCNC: NORMAL MG/DL
WBC # BLD AUTO: 9.4 10E3/UL (ref 4–11)
WBC URINE: 6 /HPF

## 2023-01-06 PROCEDURE — 81001 URINALYSIS AUTO W/SCOPE: CPT | Performed by: EMERGENCY MEDICINE

## 2023-01-06 PROCEDURE — 81003 URINALYSIS AUTO W/O SCOPE: CPT | Mod: QW | Performed by: PHYSICIAN ASSISTANT

## 2023-01-06 PROCEDURE — 93970 EXTREMITY STUDY: CPT

## 2023-01-06 PROCEDURE — 85004 AUTOMATED DIFF WBC COUNT: CPT | Performed by: EMERGENCY MEDICINE

## 2023-01-06 PROCEDURE — 93005 ELECTROCARDIOGRAM TRACING: CPT

## 2023-01-06 PROCEDURE — 99285 EMERGENCY DEPT VISIT HI MDM: CPT | Mod: 25

## 2023-01-06 PROCEDURE — 51798 US URINE CAPACITY MEASURE: CPT | Performed by: PHYSICIAN ASSISTANT

## 2023-01-06 PROCEDURE — 99203 OFFICE O/P NEW LOW 30 MIN: CPT | Mod: 25 | Performed by: PHYSICIAN ASSISTANT

## 2023-01-06 PROCEDURE — 51702 INSERT TEMP BLADDER CATH: CPT | Performed by: PHYSICIAN ASSISTANT

## 2023-01-06 PROCEDURE — 36415 COLL VENOUS BLD VENIPUNCTURE: CPT | Performed by: EMERGENCY MEDICINE

## 2023-01-06 PROCEDURE — 80053 COMPREHEN METABOLIC PANEL: CPT | Performed by: EMERGENCY MEDICINE

## 2023-01-06 RX ORDER — LIDOCAINE HYDROCHLORIDE 20 MG/ML
JELLY TOPICAL ONCE
Status: COMPLETED | OUTPATIENT
Start: 2023-01-06 | End: 2023-01-06

## 2023-01-06 RX ORDER — CIPROFLOXACIN 500 MG/1
500 TABLET, FILM COATED ORAL ONCE
Qty: 1 TABLET | Refills: 0 | Status: SHIPPED | OUTPATIENT
Start: 2023-01-06 | End: 2023-01-06

## 2023-01-06 RX ORDER — TAMSULOSIN HYDROCHLORIDE 0.4 MG/1
0.4 CAPSULE ORAL DAILY
Qty: 30 CAPSULE | Refills: 11 | Status: SHIPPED | OUTPATIENT
Start: 2023-01-06 | End: 2023-05-23

## 2023-01-06 RX ADMIN — LIDOCAINE HYDROCHLORIDE 5 ML: 20 JELLY TOPICAL at 15:00

## 2023-01-06 ASSESSMENT — ENCOUNTER SYMPTOMS
FREQUENCY: 1
DIZZINESS: 0
CHILLS: 0
LIGHT-HEADEDNESS: 0
FEVER: 0
HEMATURIA: 0
NAUSEA: 0
DIFFICULTY URINATING: 1
VOMITING: 0
CONSTIPATION: 1
SHORTNESS OF BREATH: 0
ABDOMINAL DISTENTION: 1

## 2023-01-06 ASSESSMENT — ACTIVITIES OF DAILY LIVING (ADL)
ADLS_ACUITY_SCORE: 35

## 2023-01-06 ASSESSMENT — PAIN SCALES - GENERAL: PAINLEVEL: NO PAIN (0)

## 2023-01-06 NOTE — PROGRESS NOTES
Subjective      REQUESTING PROVIDER   No ref. provider found     REASON FOR CONSULT   Difficulties with urination    HISTORY OF PRESENT ILLNESS   Mr. Smith is a very pleasant 77-year-old gentleman, who presents today for further evaluation recommendations regarding difficulties with urination.  He has previously followed with Dr. Patterson regarding elevated PSA and enlarged prostate.  He last saw Dr. Patterson on 08/05/2019.  He has previously undergone 2 negative prostate biopsies, most recently in 2012 in the setting of a PSA of 10.7.  Patient had a MRI of the prostate in 2015 that was normal with a prostate is estimated to be 76 cc.  Most recent PSA is 15.7.  He has not followed up with urology since 2019.    He does note that his urinary symptoms have been relatively stable since that time until recently.  On 12/26/2022, he began to have more difficulties with urination including urinating very small frequent amounts.  Urinalysis was obtained and was normal.  Urine culture had no growth.  Patient notes that he has maybe urinating for few tablespoons at a time.  He does have constipation.  He has found that loosening of his bowels will help his urinary symptomatology.  He will typically go every 2 days for a bowel movement.    He does note over the last several years that he has had approximately 4 episodes where he had more difficulty with urination when he was having constipation.  They have typically resolved within several hours.  This 1 has persisted.  Patient notes some occasional dysuria.  He denies any hematuria, urinary tract infections, or history of urinary retention.  At baseline, he typically has nocturia x3.  This is significantly increased.  He did note the other night he was actually able to get 3 hours of sleep.  He is very uncomfortable.    He also has noted new lower extremity swelling, right side greater than left.  This is confirmed on examination.  His abdomen is distended.  No recent kidney  "function since this started occurring.  Bladder scan today is greater than 999.  Urinalysis not concerning for infection.    The following portions of the patient's history were reviewed and updated as appropriate: allergies, current medications, past family history, past medical history, past social history, past surgical history and problem list.     REVIEW OF SYSTEMS   Review of Systems   Constitutional: Negative for chills and fever.   Respiratory: Negative for shortness of breath.    Cardiovascular: Positive for leg swelling. Negative for chest pain.   Gastrointestinal: Positive for abdominal distention and constipation. Negative for nausea and vomiting.   Genitourinary: Positive for decreased urine volume, difficulty urinating, frequency and urgency. Negative for hematuria.   Neurological: Negative for dizziness and light-headedness.      Per HPI.     Patient Active Problem List   Diagnosis     CARDIOVASCULAR SCREENING; LDL GOAL LESS THAN 130     Elevated prostate specific antigen (PSA)     ACP (advance care planning)     Retina disorder, right     AK (actinic keratosis)     Sebaceous cyst     Benign prostatic hyperplasia with nocturia     Special screening for malignant neoplasm of prostate     BENIGN HYPERTENSION     Anxiety      Past Medical History:   Diagnosis Date     Anxiety 3/23/2022     Benign prostatic hyperplasia with nocturia 3/23/2022     Enlarged prostate     per pt reprt     Hypertension      Prostate cancer (H) 3/23/2022     PSA elevation       Past Surgical History:   Procedure Laterality Date     BIOPSY  06/12/12    Prostate - High PSA - Benign     COLONOSCOPY  7/24/2013    Dr. Craig [facility]     COLONOSCOPY  7/24/2013    Procedure: COMBINED COLONOSCOPY, SINGLE BIOPSY/POLYPECTOMY BY BIOPSY;  Colonoscopy with polypectomy using cold forceps.;  Surgeon: Zach Craig MD;  Location: [facility]      Social History:   Single.   Never smoker.    Objective      PHYSICAL EXAM   /82   Ht 1.702 m (5' 7\")  "  Wt 58.1 kg (128 lb)   BMI 20.05 kg/m     Physical Exam  Constitutional:       Appearance: Normal appearance.   HENT:      Head: Normocephalic.      Nose: Nose normal.   Eyes:      General: No scleral icterus.  Cardiovascular:      Pulses: Normal pulses.   Pulmonary:      Effort: Pulmonary effort is normal.   Abdominal:      General: There is distension.      Tenderness: There is abdominal tenderness.   Musculoskeletal:         General: Normal range of motion.      Cervical back: Normal range of motion.      Right lower leg: Edema present.      Left lower leg: Edema present.   Skin:     General: Skin is warm and dry.   Neurological:      General: No focal deficit present.      Mental Status: He is alert and oriented to person, place, and time.   Psychiatric:         Mood and Affect: Mood normal.         Behavior: Behavior normal.        LABORATORY   Recent Labs   Lab Test 01/06/23  1246   COLOR Yellow   APPEARANCE Clear   URINEGLC Negative   URINEBILI Negative   URINEKETONE Negative   SG 1.020   UBLD Negative   URINEPH 7.0   PROTEIN Negative   UROBILINOGEN 0.2   NITRITE Negative   LEUKEST Negative     Urine culture: No growth.    Lab Results   Component Value Date    PSA 15.70 (H) 02/05/2020    PSA 12.20 (H) 07/23/2019    PSA 14.80 (H) 01/21/2019    PSA 11.70 (H) 01/18/2018      TESTING    PVR: >999    Assessment & Plan    1. Urinary retention    2. Elevated prostate specific antigen (PSA)    3. Benign prostatic hyperplasia (BPH) with straining on urination        I had the pleasure today of meeting with Mr. Smith to discuss his difficulties with urination.  He has a history of elevated PSA and known BPH.  MRI estimated his prostate to be 76 mL in 2015.  Patient has been having difficulties with urinating for almost 2 weeks.  Postvoid residual today was significantly elevated at greater than 999.  He is distended on examination up to the umbilicus.  This is tender.  Patient also has new onset lower extremity  edema, right side greater than left.  He also is having difficulties with his bowel movements.    I discussed with the patient that I have significant concern that when we unobstruct him that we will have postobstructive diuresis and possible issues with his electrolytes.  Additionally, I do not have baseline kidney function at this time as well as evidence that there is any hydronephrosis from this retention.  I do have concern additionally about his kidney function given new onset lower extremity edema.    Will recommend the following:    -We will place Raygoza catheter in clinic today.  We will measure urine output and record.    Surprising only 1800 mL intially, but will still send to ER given new edema.    -Patient should proceed to the emergency department for new onset lower extremity edema as well as concern for postobstructive diuresis and kidney dysfunction.  I discussed the concern of being able to manage this outpatient on a Friday afternoon.    -Patient should be started on aggressive bowel regimen.    -Start Flomax 0.4 mg once daily if his blood pressure can tolerate it.    -We will plan on setting patient up for outpatient cystoscopy with Dr. Patterson to evaluate degree of BPH.  We will plan on digital rectal examination at that time.    -Likely voiding trial in 1 month.  May need CIC teaching.  Our office will coordinate.    Signed by:     Jessica Singh PA-C 1/6/2023 1:36 PM

## 2023-01-06 NOTE — LETTER
1/6/2023       RE: Paul Smith  [address] [address]     Dear Colleague,    Thank you for referring your patient, Paul Smith, to the [facility] [place] at [facility]. Please see a copy of my visit note below.    Subjective       REQUESTING PROVIDER   No ref. provider found     REASON FOR CONSULT   Difficulties with urination    HISTORY OF PRESENT ILLNESS   Mr. Smith is a very pleasant 77-year-old gentleman, who presents today for further evaluation recommendations regarding difficulties with urination.  He has previously followed with Dr. Patterson regarding elevated PSA and enlarged prostate.  He last saw Dr. Patterson on 08/05/2019.  He has previously undergone 2 negative prostate biopsies, most recently in 2012 in the setting of a PSA of 10.7.  Patient had a MRI of the prostate in 2015 that was normal with a prostate is estimated to be 76 cc.  Most recent PSA is 15.7.  He has not followed up with urology since 2019.    He does note that his urinary symptoms have been relatively stable since that time until recently.  On 12/26/2022, he began to have more difficulties with urination including urinating very small frequent amounts.  Urinalysis was obtained and was normal.  Urine culture had no growth.  Patient notes that he has maybe urinating for few tablespoons at a time.  He does have constipation.  He has found that loosening of his bowels will help his urinary symptomatology.  He will typically go every 2 days for a bowel movement.    He does note over the last several years that he has had approximately 4 episodes where he had more difficulty with urination when he was having constipation.  They have typically resolved within several hours.  This 1 has persisted.  Patient notes some occasional dysuria.  He denies any hematuria, urinary tract infections, or history of urinary retention.  At baseline, he typically has  nocturia x3.  This is significantly increased.  He did note the other night he was actually able to get 3 hours of sleep.  He is very uncomfortable.    He also has noted new lower extremity swelling, right side greater than left.  This is confirmed on examination.  His abdomen is distended.  No recent kidney function since this started occurring.  Bladder scan today is greater than 999.  Urinalysis not concerning for infection.    The following portions of the patient's history were reviewed and updated as appropriate: allergies, current medications, past family history, past medical history, past social history, past surgical history and problem list.     REVIEW OF SYSTEMS   Review of Systems   Constitutional: Negative for chills and fever.   Respiratory: Negative for shortness of breath.    Cardiovascular: Positive for leg swelling. Negative for chest pain.   Gastrointestinal: Positive for abdominal distention and constipation. Negative for nausea and vomiting.   Genitourinary: Positive for decreased urine volume, difficulty urinating, frequency and urgency. Negative for hematuria.   Neurological: Negative for dizziness and light-headedness.      Per HPI.     Patient Active Problem List   Diagnosis     CARDIOVASCULAR SCREENING; LDL GOAL LESS THAN 130     Elevated prostate specific antigen (PSA)     ACP (advance care planning)     Retina disorder, right     AK (actinic keratosis)     Sebaceous cyst     Benign prostatic hyperplasia with nocturia     Special screening for malignant neoplasm of prostate     BENIGN HYPERTENSION     Anxiety      Past Medical History:   Diagnosis Date     Anxiety 3/23/2022     Benign prostatic hyperplasia with nocturia 3/23/2022     Enlarged prostate     per pt reprt     Hypertension      Prostate cancer (H) 3/23/2022     PSA elevation       Past Surgical History:   Procedure Laterality Date     BIOPSY  06/12/12    Prostate - High PSA - Benign     COLONOSCOPY  7/24/2013    Dr. Craig [facility]  "    COLONOSCOPY  7/24/2013    Procedure: COMBINED COLONOSCOPY, SINGLE BIOPSY/POLYPECTOMY BY BIOPSY;  Colonoscopy with polypectomy using cold forceps.;  Surgeon: Zach Craig MD;  Location: [facility]      Social History:   Single.   Never smoker.    Objective       PHYSICAL EXAM   /82   Ht 1.702 m (5' 7\")   Wt 58.1 kg (128 lb)   BMI 20.05 kg/m     Physical Exam  Constitutional:       Appearance: Normal appearance.   HENT:      Head: Normocephalic.      Nose: Nose normal.   Eyes:      General: No scleral icterus.  Cardiovascular:      Pulses: Normal pulses.   Pulmonary:      Effort: Pulmonary effort is normal.   Abdominal:      General: There is distension.      Tenderness: There is abdominal tenderness.   Musculoskeletal:         General: Normal range of motion.      Cervical back: Normal range of motion.      Right lower leg: Edema present.      Left lower leg: Edema present.   Skin:     General: Skin is warm and dry.   Neurological:      General: No focal deficit present.      Mental Status: He is alert and oriented to person, place, and time.   Psychiatric:         Mood and Affect: Mood normal.         Behavior: Behavior normal.        LABORATORY   Recent Labs   Lab Test 01/06/23  1246   COLOR Yellow   APPEARANCE Clear   URINEGLC Negative   URINEBILI Negative   URINEKETONE Negative   SG 1.020   UBLD Negative   URINEPH 7.0   PROTEIN Negative   UROBILINOGEN 0.2   NITRITE Negative   LEUKEST Negative     Urine culture: No growth.    Lab Results   Component Value Date    PSA 15.70 (H) 02/05/2020    PSA 12.20 (H) 07/23/2019    PSA 14.80 (H) 01/21/2019    PSA 11.70 (H) 01/18/2018      TESTING    PVR: >999    Assessment & Plan    1. Urinary retention    2. Elevated prostate specific antigen (PSA)    3. Benign prostatic hyperplasia (BPH) with straining on urination        I had the pleasure today of meeting with Mr. Smith to discuss his difficulties with urination.  He has a history of elevated PSA and known BPH.  " MRI estimated his prostate to be 76 mL in 2015.  Patient has been having difficulties with urinating for almost 2 weeks.  Postvoid residual today was significantly elevated at greater than 999.  He is distended on examination up to the umbilicus.  This is tender.  Patient also has new onset lower extremity edema, right side greater than left.  He also is having difficulties with his bowel movements.    I discussed with the patient that I have significant concern that when we unobstruct him that we will have postobstructive diuresis and possible issues with his electrolytes.  Additionally, I do not have baseline kidney function at this time as well as evidence that there is any hydronephrosis from this retention.  I do have concern additionally about his kidney function given new onset lower extremity edema.    Will recommend the following:    -We will place Raygoza catheter in clinic today.  We will measure urine output and record.    Surprising only 1800 mL intially, but will still send to ER given new edema.    -Patient should proceed to the emergency department for new onset lower extremity edema as well as concern for postobstructive diuresis and kidney dysfunction.  I discussed the concern of being able to manage this outpatient on a Friday afternoon.    -Patient should be started on aggressive bowel regimen.    -Start Flomax 0.4 mg once daily if his blood pressure can tolerate it.    -We will plan on setting patient up for outpatient cystoscopy with Dr. Patterson to evaluate degree of BPH.  We will plan on digital rectal examination at that time.    -Likely voiding trial in 1 month.  May need CIC teaching.  Our office will coordinate.    Signed by:     Jessica Singh PA-C 1/6/2023 1:36 PM

## 2023-01-06 NOTE — NURSING NOTE
Chief Complaint   Patient presents with     Urinary Retention     Pt has had difficulty with urinating since 12/26, pt states he is only able to get dribbles out at a time, and has noticed swelling in his abdomen and legs.    PVR: 999 +      Catheter insertion documentation on 1/6/2023:    Paul Smith presents to the clinic for catheter insertion.  Reason for insertion: urinary retention  Order has been verified. yes  Catheter successfully inserted into the urethral meatus in the usual sterile fashion without immediate complication.  Type of catheter placed: 16 [place] Cou[place] catheter  Urine is yellow in color.  1800 cc's of urine output returned.  Balloon was filled with 8 cc's of normal saline.  Securement device placed for the catheter.  The patient tolerated the procedure and was instructed to follow up with their PCP or consultant as planned and monitor for catheter dysfunction, pain, or signs of infection. One antibiotic was sent into pt's preferred pharmacy, pt was instructed to take today.      5mL 2% lidocaine hydrochloride Urojet instilled into urethra.    NDC# [address]-1  Lot #: [id number]  Expiration Date:  08/24      Khuhsbu Jackson CMA

## 2023-01-06 NOTE — PATIENT INSTRUCTIONS
Will recommend the following:    -We will place Raygoza catheter in clinic today.  We will measure urine output and record.    All output should be measured today.    -Patient should proceed to the emergency department for new onset lower extremity edema as well as concern for postobstructive diuresis and kidney dysfunction.  I discussed the concern of being able to manage this outpatient on a Friday afternoon.    -Patient should be started on aggressive bowel regimen.  Outpatient can use Senna and daily MiraLax.      -Start Flomax 0.4 mg once daily.     Possible side effects include lightheaded and dizziness and retrograde ejaculation (ejaculate goes into the bladder).  Less common side effects include backache and nasal congestion.     -We will plan on setting patient up for outpatient cystoscopy with Dr. Patterson to evaluate degree of BPH.  We will plan on digital rectal examination at that time.    -Likely voiding trial in 1 month.  May need intermittent catheterization teaching.  Our office will coordinate.

## 2023-01-06 NOTE — ED PROVIDER NOTES
PIT/Triage Evaluation    Patient presented with concerns for lower extremity swelling x2 days in the setting of urinary retention (acute on chronic due to prostate hypertrophy) for which he was seen in urology clinic prior to assessment here where a urinalysis was obtained (see below) and Raygoza catheter was placed due to retention of over 1L of urine. He notes right leg is more swollen than left and this is new for him. No associated pain, weakness, color change. No abdominal pain, flank pain, fever, n/v, or stool changes. He currently feels at baseline aside from the new leg swelling. No recent travel, surgery, immobilization/long travel, h/o DVT/PE.    Exam is notable for pitting edema right lower leg, trace edema left lower leg. Nontender extremities. No respiratory distress, lungs are clear. HR WNL (elevated in triage), normal S1S2, no appreciable murmur. No abdominal tenderness to palpation or CVA tenderness to percussion.     Appropriate interventions for symptom management were initiated if applicable.  Appropriate diagnostic tests were initiated if indicated.    Important information for subsequent clinician     Lab Test 01/06/23  1246   COLOR Yellow   APPEARANCE Clear   URINEGLC Negative   URINEBILI Negative   URINEKETONE Negative   SG 1.020   UBLD Negative   URINEPH 7.0   PROTEIN Negative   UROBILINOGEN 0.2   NITRITE Negative   LEUKEST Negative      Urine culture: No growth.      I briefly evaluated the patient and developed an initial plan of care. I discussed this plan and explained that this brief interaction does not constitute a full evaluation. Patient/family understands that they should wait to be fully evaluated and discuss any test results with another clinician prior to leaving the hospital.    Due to hospital and departmental capacity constraints and prolonged wait times, this patient was evaluated in non-traditional circumstances such as in triage/waiting room, a hallway, etc. I explained the  option to wait for a traditional treatment space and apologized for the inconvenience. Given the circumstances, every attempt was made to provide for the patient's comfort and privacy and to perform the most thorough evaluation possible.     Pily Braun MD  01/06/23 8394

## 2023-01-06 NOTE — ED TRIAGE NOTES
Urinary retention since December 25th with increased urinary frequency. Seen at clinic today, nath catheter placed and referred to ED fro labs work and evaluation of lower extremity swelling. VSS on RA. Pt reports relief from urinary symptoms at this time. No labs collected in clinic.

## 2023-01-07 ENCOUNTER — MYC MEDICAL ADVICE (OUTPATIENT)
Dept: FAMILY MEDICINE | Facility: CLINIC | Age: 78
End: 2023-01-07

## 2023-01-07 LAB
ATRIAL RATE - MUSE: 61 BPM
DIASTOLIC BLOOD PRESSURE - MUSE: NORMAL MMHG
INTERPRETATION ECG - MUSE: NORMAL
P AXIS - MUSE: 36 DEGREES
PR INTERVAL - MUSE: 162 MS
QRS DURATION - MUSE: 98 MS
QT - MUSE: 464 MS
QTC - MUSE: 467 MS
R AXIS - MUSE: -49 DEGREES
SYSTOLIC BLOOD PRESSURE - MUSE: NORMAL MMHG
T AXIS - MUSE: 33 DEGREES
VENTRICULAR RATE- MUSE: 61 BPM

## 2023-01-07 ASSESSMENT — ENCOUNTER SYMPTOMS
COLOR CHANGE: 0
BLOOD IN STOOL: 0
ABDOMINAL PAIN: 0
FEVER: 0
DIARRHEA: 0
MYALGIAS: 0
WEAKNESS: 0
NAUSEA: 0
FLANK PAIN: 0
CONSTIPATION: 0
VOMITING: 0

## 2023-01-07 NOTE — ED PROVIDER NOTES
History     Chief Complaint:  Urinary problems     HPI   Paul Smith is a 77 year old male who presents with urinary problems. Patient presented with concerns for lower extremity swelling x2 days in the setting of urinary retention (acute on chronic due to prostate hypertrophy) for which he was seen in urology clinic prior to assessment here where a urinalysis was obtained (see below) and Raygoza catheter was placed due to retention of over 1L of urine. He notes right leg is more swollen than left and this is new for him. No associated pain, weakness, color change. No abdominal pain, flank pain, fever, n/v, or stool changes. He currently feels at baseline aside from the new leg swelling. No recent travel, surgery, immobilization/long travel, h/o DVT/PE.    Independent Historian: Yes    Review of External Notes: Reviewed urology clinic note and results    ROS:  Review of Systems   Constitutional: Negative for fever.   Cardiovascular: Positive for leg swelling.   Gastrointestinal: Negative for abdominal pain, blood in stool, constipation, diarrhea, nausea and vomiting.   Genitourinary: Negative for flank pain.        Urinary retention   Musculoskeletal: Negative for myalgias.   Skin: Negative for color change.   Neurological: Negative for weakness.   All other systems reviewed and are negative.      Allergies:  No Known Drug Allergies      Medications:    Norvasc  Aspirin 81 mg  Zestril  Flomax    Past Medical History:    Anxiety  Benign prostatic hyperplasia with nocturia   Enlarged prostate   Hypertension   Prostate cancer     Past Surgical History:    Prostate biopsy  Colonoscopy    Social History:   The patient presents to the ED  PCP: Won Menjivar     Physical Exam     Patient Vitals for the past 24 hrs:   BP Temp Temp src Pulse Resp SpO2   01/06/23 2216 -- -- -- -- -- 99 %   01/06/23 2215 (!) 144/98 -- -- 67 -- --   01/06/23 1438 (!) 169/81 97.1  F (36.2  C) Temporal (!) 128 19 99 %      Physical  Exam  General: Elderly male sitting upright  Eyes: PERRL, Conjunctive within normal limits  ENT: Moist mucous membranes, oropharynx clear.   CV: Normal S1S2, no murmur, rub or gallop. Regular rate and rhythm  Resp: Clear to auscultation bilaterally, no wheezes, rales or rhonchi. Normal respiratory effort.  GI: Abdomen is soft, nontender and nondistended. No palpable masses. No rebound or guarding.   Raygoza catheter in place.   MSK: Bilateral lower extremity edema, left greater than right, 1+ pitting. Nontender. Normal active range of motion.  Skin: Warm and dry. No rashes or lesions or ecchymoses on visible skin.  Neuro: Alert and oriented. Responds appropriately to all questions and commands. No focal findings noted.   Psych: Normal mood and affect. Pleasant.    Emergency Department Course   ECG:  ECG results from 01/06/23   EKG 12 lead     Value    Systolic Blood Pressure     Diastolic Blood Pressure     Ventricular Rate 61    Atrial Rate 61    HI Interval 162    QRS Duration 98        QTc 467    P Axis 36    R AXIS -49    T Axis 33    Interpretation ECG      Sinus rhythm  Incomplete right bundle branch block  Left anterior fascicular block  Minimal voltage criteria for LVH, may be normal variant  Abnormal ECG  No previous ECGs available       Imaging:  US Lower Extremity Venous Duplex Bilateral   Final Result   IMPRESSION:   1.  No deep venous thrombosis in the bilateral lower extremities.         Report per radiology    Laboratory:  Labs Ordered and Resulted from Time of ED Arrival to Time of ED Departure   ROUTINE UA WITH MICROSCOPIC REFLEX TO CULTURE - Abnormal       Result Value    Color Urine Light Yellow      Appearance Urine Slightly Cloudy (*)     Glucose Urine Negative      Bilirubin Urine Negative      Ketones Urine Negative      Specific Gravity Urine 1.009      Blood Urine Moderate (*)     pH Urine 8.0 (*)     Protein Albumin Urine 10 (*)     Urobilinogen Urine Normal      Nitrite Urine  Negative      Leukocyte Esterase Urine Negative      Bacteria Urine Few (*)     Mucus Urine Present (*)     Amorphous Crystals Urine Moderate (*)     RBC Urine 37 (*)     WBC Urine 6 (*)     Squamous Epithelials Urine <1     COMPREHENSIVE METABOLIC PANEL - Abnormal    Sodium 140      Potassium 3.7      Chloride 102      Carbon Dioxide (CO2) 29      Anion Gap 9      Urea Nitrogen 17.2      Creatinine 0.60 (*)     Calcium 9.2      Glucose 111 (*)     Alkaline Phosphatase 93       (*)     ALT 72 (*)     Protein Total 7.4      Albumin 4.5      Bilirubin Total 0.7      GFR Estimate >90     CBC WITH PLATELETS AND DIFFERENTIAL - Abnormal    WBC Count 9.4      RBC Count 4.43      Hemoglobin 12.6 (*)     Hematocrit 39.4 (*)     MCV 89      MCH 28.4      MCHC 32.0      RDW 13.6      Platelet Count 237      % Neutrophils 82      % Lymphocytes 10      % Monocytes 6      % Eosinophils 1      % Basophils 1      % Immature Granulocytes 0      NRBCs per 100 WBC 0      Absolute Neutrophils 7.7      Absolute Lymphocytes 0.9      Absolute Monocytes 0.5      Absolute Eosinophils 0.1      Absolute Basophils 0.1      Absolute Immature Granulocytes 0.0      Absolute NRBCs 0.0        Emergency Department Course & Assessments:       Interventions:  Medications - No data to display     Consultations/Discussion of Management or Tests:  ED Course as of 01/07/23 0407   Fri Jan 06, 2023 2157 I rechecked the patient and explained findings.      Social Determinants of Health affecting care:  None.     Disposition:  The patient was discharged to home.     Impression & Plan      Medical Decision Making:  Paul Smith is a 77 year old male who presents for evaluation of leg swelling, bilateral but right greater than left. A broad differential was considered including Baker's cyst rupture, Baker's cyst, hematoma, rupture, compartment syndrome, muscle rupture, DVT, superficial thrombophlebitis, compression of the venous structures higher up  in the abdomen and or leg cellulitis/ abscess, etc. Ultrasound is negative. Renal functions is normal.  There are no signs of compartment syndrome or other worrisome etiologies at this point so outpatient management is indicated.  I doubt more central causes of their swelling like renal failure, chf, liver disease, etc. They will elevate leg, do gentle dorsal flexion and plantar flexion motions, take Tylenol for pain, and avoid strenuous activity. They should followup with his primary care doctor for further assessment within 3-5 days.  Written copy of lab data given directly to patient and questions answered.  Return precautions discussed. Urinary retention to be further assessed by PCP.         Diagnosis:    ICD-10-CM    1. Bilateral lower extremity edema  R60.0       2. Urinary retention  R33.9          Discharge Medications:  Discharge Medication List as of 1/6/2023 10:07 PM      START taking these medications    Details   ciprofloxacin (CIPRO) 500 MG tablet Take 1 tablet (500 mg) by mouth once for 1 dose, Disp-1 tablet, R-0, E-Prescribe            Scribe Disclosure:  I, Shukri Shoemaker, am serving as a scribe at 9:41 PM on 1/6/2023 to document services personally performed by Pily Braun MD based on my observations and the provider's statements to me.     1/6/2023   Pily Braun MD Jonkman, Tracy Dianne, MD  03/14/23 0206

## 2023-01-09 ENCOUNTER — OFFICE VISIT (OUTPATIENT)
Dept: FAMILY MEDICINE | Facility: CLINIC | Age: 78
End: 2023-01-09
Payer: MEDICARE

## 2023-01-09 VITALS
DIASTOLIC BLOOD PRESSURE: 72 MMHG | SYSTOLIC BLOOD PRESSURE: 129 MMHG | WEIGHT: 131 LBS | HEIGHT: 67 IN | OXYGEN SATURATION: 100 % | TEMPERATURE: 97.5 F | HEART RATE: 68 BPM | BODY MASS INDEX: 20.56 KG/M2

## 2023-01-09 DIAGNOSIS — I10 ESSENTIAL HYPERTENSION, BENIGN: ICD-10-CM

## 2023-01-09 DIAGNOSIS — R35.1 BENIGN PROSTATIC HYPERPLASIA WITH NOCTURIA: ICD-10-CM

## 2023-01-09 DIAGNOSIS — Z76.89 ENCOUNTER FOR ASSESSMENT OF FOLEY CATHETER: Primary | ICD-10-CM

## 2023-01-09 DIAGNOSIS — N40.1 BENIGN PROSTATIC HYPERPLASIA WITH NOCTURIA: ICD-10-CM

## 2023-01-09 PROCEDURE — 99213 OFFICE O/P EST LOW 20 MIN: CPT

## 2023-01-09 RX ORDER — CIPROFLOXACIN 500 MG/1
TABLET, FILM COATED ORAL
COMMUNITY
Start: 2023-01-06 | End: 2023-05-09

## 2023-01-09 RX ORDER — LISINOPRIL 20 MG/1
TABLET ORAL
Qty: 90 TABLET | Refills: 0 | OUTPATIENT
Start: 2023-01-09

## 2023-01-09 RX ORDER — AMLODIPINE BESYLATE 5 MG/1
5 TABLET ORAL DAILY
Qty: 90 TABLET | Refills: 1 | Status: SHIPPED | OUTPATIENT
Start: 2023-01-09 | End: 2023-05-09

## 2023-01-09 RX ORDER — LISINOPRIL 20 MG/1
20 TABLET ORAL DAILY
Qty: 90 TABLET | Refills: 1 | Status: SHIPPED | OUTPATIENT
Start: 2023-01-09 | End: 2023-05-09

## 2023-01-09 NOTE — TELEPHONE ENCOUNTER
[facility] calls to discuss drug allergies, nothing listed, also informed pt has been on lisinopril 20 mg x years, See visit today, pt was seen today and lisinopril sent to [facility], denied at mail order  Adina Pandey RN, BSN  [facility]

## 2023-01-09 NOTE — TELEPHONE ENCOUNTER
See pt's Mychart message.     He is scheduled on 1/9/23 at 3:40 pm.     Alla Ko RN   PAL (Patient Advocate Liason)  [facility]

## 2023-01-09 NOTE — PROGRESS NOTES
Assessment & Plan     (Z76.89) Encounter for assessment of Nath catheter  (primary encounter diagnosis)  Comment: no concerns with nath at this time. Patient performing pericare adequately at home. However, patient does have alis red blood in urine. Instructed patient that if this continues and does not show signs of improvement by this Friday (1/13/2023) that he should contact urology. Patient agreeable with plan of care and at this point patient will follow up as needed unless acute concerns arise in the meantime.  Plan: see above.     (N40.1,  R35.1) Benign prostatic hyperplasia with nocturia  Comment: seein urology, nath catheter to stay in until 1/31/2023 revisit with Urology.   Plan: see above.     (I10) BENIGN HYPERTENSION  Comment: stable, refills needed. Amlodipine and lisinopril refilled. Monitor.  Plan: amLODIPine (NORVASC) 5 MG tablet, lisinopril         (ZESTRIL) 20 MG tablet          No follow-ups on file.    HUMZA Gr [facility] ORLANDO Perez is a 77 year old, presenting for the following health issues:  No chief complaint on file.      History of Present Illness       Reason for visit:  Nath Catheter assessment installed on 1-6-23 because I was unable to urinate very much and my bladder filled up.    He eats 4 or more servings of fruits and vegetables daily.He consumes 0 sweetened beverage(s) daily.He exercises with enough effort to increase his heart rate 20 to 29 minutes per day.  He exercises with enough effort to increase his heart rate 6 days per week.   He is taking medications regularly.     Checking up on the Nath Catheter placed on 01/06/2023, patient stated he has to keep it until the 01/31/2023 when seeing his urology     -Alis red blood urine since insertion  -No clots noted, able to empty and urinate adequately  -Sleeping not interrupted, improved if anything  -Removed over 2 L of urine with insertion   -denies fever, chills,  "myalgias, SOB, bleeding from meatus, purulent drainage from meatus, sleep disturbances, lightheadedness, dizziness, syncope.     Review of Systems   Constitutional, HEENT, cardiovascular, pulmonary, gi and gu systems are negative, except as otherwise noted.      Objective    /72 (BP Location: Right arm, Patient Position: Sitting, Cuff Size: Adult Regular)   Pulse 68   Temp 97.5  F (36.4  C) (Oral)   Ht 1.702 m (5' 7\")   Wt 59.4 kg (131 lb)   SpO2 100%   BMI 20.52 kg/m    Body mass index is 20.52 kg/m .  Physical Exam  Vitals and nursing note reviewed.   Constitutional:       General: He is not in acute distress.     Appearance: Normal appearance. He is not ill-appearing.   Cardiovascular:      Rate and Rhythm: Normal rate and regular rhythm.      Heart sounds: No murmur heard.    No friction rub. No gallop.   Pulmonary:      Effort: Pulmonary effort is normal. No respiratory distress.      Breath sounds: No wheezing, rhonchi or rales.   Genitourinary:     Penis: Normal.    Skin:     General: Skin is warm and dry.   Neurological:      Mental Status: He is alert.   Psychiatric:         Mood and Affect: Mood normal.         Behavior: Behavior normal. Behavior is cooperative.         Thought Content: Thought content normal.         Judgment: Judgment normal.                "

## 2023-01-09 NOTE — TELEPHONE ENCOUNTER
See pt's [employer] message.     Replied to message notifying pt to schedule a hospital follow up appointment.     Alla Ko RN   PAL (Patient Advocate Liason)  [facility]

## 2023-01-10 ENCOUNTER — TELEPHONE (OUTPATIENT)
Dept: FAMILY MEDICINE | Facility: CLINIC | Age: 78
End: 2023-01-10

## 2023-01-10 ENCOUNTER — TELEPHONE (OUTPATIENT)
Dept: UROLOGY | Facility: CLINIC | Age: 78
End: 2023-01-10

## 2023-01-10 DIAGNOSIS — K59.00 CONSTIPATION, UNSPECIFIED CONSTIPATION TYPE: Primary | ICD-10-CM

## 2023-01-10 RX ORDER — SENNOSIDES A AND B 8.6 MG/1
1-2 TABLET, FILM COATED ORAL DAILY
Qty: 60 TABLET | Refills: 0 | Status: SHIPPED | OUTPATIENT
Start: 2023-01-10 | End: 2023-02-09

## 2023-01-10 NOTE — TELEPHONE ENCOUNTER
Returned phone call and spoke with patient. He was informed that as long as nath catheter is draining, which it now appears to be according to patient. It was leaking around catheter through penis and then about 800ml of urine passed through to urinary drainage bag according to patient. Explained to patient that this is likely a bladder spasm due to constipation. He reports that he has tried a couple different stool softeners and not able to pass solid waste. Instructed patient to call his primary care MD regarding the inability to pass stool. Explained that they would be better at managing the constipation issue. Patient expressed understanding and was okay with this response.     Antoinette Hallman LPN

## 2023-01-10 NOTE — TELEPHONE ENCOUNTER
Patient states he saw Dillon yesterday about catheter.  States he forgot to mention he has not had a BM for 4 days.  He spoke to Urology and they advised Miralax.  Has been doing 1 capful of Miralax for 3 days with no results.  Took 2 dulcolax at 4 am and no result yet.  States he needs something stronger.  No discomfort or distention.  States he sent message to Urology and they advised to call primary clinic for advise.  Please advise.  Sneha Retana RN

## 2023-01-10 NOTE — TELEPHONE ENCOUNTER
At this point I would recommend he try sennakot 1-2 tabs take at bedtime. I can place this order. He should ensure he is staying hydrated and getting adequate fiber in his diet as well.  HUMZA Gr CNP

## 2023-01-10 NOTE — TELEPHONE ENCOUNTER
Patient called back, informed him of recommendations below.  Advised Prune juice is a natural BM stimulant.     TAMERA Pathak on 1/10/2023 at 2:54 PM

## 2023-01-10 NOTE — TELEPHONE ENCOUNTER
[facility] Call Center    Phone Message    May a detailed message be left on voicemail: yes     Reason for Call: Pt called stating he recently had a nath cath placed on 1/6 and has not been able to pass any solids since. Pt is concerned as this is not normal but stated that he was not in any pain. Please reach out to pt ASAP. Thanks    Action Taken: Other: Uro    Travel Screening: Not Applicable

## 2023-01-26 ENCOUNTER — TRANSFERRED RECORDS (OUTPATIENT)
Dept: HEALTH INFORMATION MANAGEMENT | Facility: CLINIC | Age: 78
End: 2023-01-26

## 2023-02-01 ENCOUNTER — ALLIED HEALTH/NURSE VISIT (OUTPATIENT)
Dept: UROLOGY | Facility: CLINIC | Age: 78
End: 2023-02-01
Payer: MEDICARE

## 2023-02-01 DIAGNOSIS — R33.9 URINARY RETENTION: Primary | ICD-10-CM

## 2023-02-01 PROCEDURE — 99207 PR NO CHARGE NURSE ONLY: CPT

## 2023-02-01 NOTE — PROGRESS NOTES
Paul Smith comes into clinic today at the request of Dr. Patterson Ordering Provider for Trial of Void, CIC teach.    Patient presents to clinic for a trial of void per MD order. Patient properly identified and procedure explained to patient. Patient's leg-bag emptied, clear-yellow urine drained from patient's catheter.   Catheter was then detached from leg-bag and sterile cysto tubing inserted into catheter opening. Via gravity 350 cc's sterile water was gently instilled with brief pauses into bladder. Patient tolerated fairly well and then catheter balloon was completely deflated. Raygoza catheter was removed from bladder. Patient was able to void 50 cc's following procedure.  Per Dr. Patterson pt was taught clean intermittent self catheterization today.    Using proper hygiene, pt was instructed today on clean intermittent self catheterization. Pt was able to successfully pass a 14 fr coude Gentle Cath Glide hydrophilic catheter with a urine return of 400 mL. Pt has chronic urinary retention, pt is unable to pass a straight tip catheter and requires a coude tip due to anatomy. Per Dr. Patterson pt is to practice CIC 4 times daily. Pt will follow up with Dr. Patterson this Friday for cystoscopy.    This service provided today was under the supervising provider of the day Dr. Patterson, who was available if needed.    Khushbu Jackson, CMA

## 2023-02-03 ENCOUNTER — OFFICE VISIT (OUTPATIENT)
Dept: UROLOGY | Facility: CLINIC | Age: 78
End: 2023-02-03
Payer: MEDICARE

## 2023-02-03 ENCOUNTER — LAB (OUTPATIENT)
Dept: LAB | Facility: CLINIC | Age: 78
End: 2023-02-03
Payer: MEDICARE

## 2023-02-03 VITALS
HEIGHT: 67 IN | BODY MASS INDEX: 20.56 KG/M2 | SYSTOLIC BLOOD PRESSURE: 120 MMHG | WEIGHT: 131 LBS | DIASTOLIC BLOOD PRESSURE: 82 MMHG

## 2023-02-03 DIAGNOSIS — R97.20 ELEVATED PROSTATE SPECIFIC ANTIGEN (PSA): ICD-10-CM

## 2023-02-03 DIAGNOSIS — R33.9 URINARY RETENTION: Primary | ICD-10-CM

## 2023-02-03 DIAGNOSIS — R33.9 URINARY RETENTION: ICD-10-CM

## 2023-02-03 DIAGNOSIS — N40.0 ENLARGED PROSTATE: ICD-10-CM

## 2023-02-03 DIAGNOSIS — Z79.2 PROPHYLACTIC ANTIBIOTIC: ICD-10-CM

## 2023-02-03 LAB — PSA SERPL-MCNC: 8.49 NG/ML (ref 0–6.5)

## 2023-02-03 PROCEDURE — 51798 US URINE CAPACITY MEASURE: CPT | Performed by: UROLOGY

## 2023-02-03 PROCEDURE — 36415 COLL VENOUS BLD VENIPUNCTURE: CPT

## 2023-02-03 PROCEDURE — 84153 ASSAY OF PSA TOTAL: CPT

## 2023-02-03 PROCEDURE — 99214 OFFICE O/P EST MOD 30 MIN: CPT | Mod: 25 | Performed by: UROLOGY

## 2023-02-03 RX ORDER — LIDOCAINE HYDROCHLORIDE 20 MG/ML
JELLY TOPICAL ONCE
Status: COMPLETED | OUTPATIENT
Start: 2023-02-03 | End: 2023-02-03

## 2023-02-03 RX ORDER — CIPROFLOXACIN 500 MG/1
500 TABLET, FILM COATED ORAL ONCE
Qty: 1 TABLET | Refills: 0 | Status: SHIPPED | OUTPATIENT
Start: 2023-02-03 | End: 2023-02-03

## 2023-02-03 RX ADMIN — LIDOCAINE HYDROCHLORIDE 5 ML: 20 JELLY TOPICAL at 08:14

## 2023-02-03 ASSESSMENT — PAIN SCALES - GENERAL: PAINLEVEL: NO PAIN (0)

## 2023-02-03 NOTE — PATIENT INSTRUCTIONS

## 2023-02-03 NOTE — LETTER
2/3/2023       RE: Paul Smith  [address] [address]     Dear Colleague,    Thank you for referring your patient, Paul Smith, to the [facility] [place] at [facility]. Please see a copy of my visit note below.    Office Visit Note  [facility]  [phone]    UROLOGIC DIAGNOSES:   Enlarged prostate  Urinary retention  Elevated PSA    CURRENT INTERVENTIONS:   Negative prostate biopsy x2  MRI prostate 2015  Self-catheterization    HISTORY:   Chris was seen in the urology clinic earlier this month with difficulty urinating.  He had a Raygoza catheter placed for 1800mL of urine output.  He was started on Flomax at that time.  He had his catheter removed 2 days ago and has been performing self-catheterization.  He has been self catheterizing 4 times daily without difficulty but is not urinating in between catheterizations.  He says he does get an urge to urinate.      PAST MEDICAL HISTORY:   Past Medical History:   Diagnosis Date     Anxiety 3/23/2022     Benign prostatic hyperplasia with nocturia 3/23/2022     Enlarged prostate     per pt reprt     Hypertension      Prostate cancer (H) 3/23/2022     PSA elevation        PAST SURGICAL HISTORY:   Past Surgical History:   Procedure Laterality Date     BIOPSY  06/12/12    Prostate - High PSA - Benign     COLONOSCOPY  7/24/2013    Dr. Craig [facility]     COLONOSCOPY  7/24/2013    Procedure: COMBINED COLONOSCOPY, SINGLE BIOPSY/POLYPECTOMY BY BIOPSY;  Colonoscopy with polypectomy using cold forceps.;  Surgeon: Zach Craig MD;  Location:  GI       FAMILY HISTORY:   Family History   Problem Relation Age of Onset     Hypertension Mother         Dementia     Alzheimer Disease Mother         dementia     Cancer Father 64        lymphoma cancer     Heart Disease Father         triple by-pass     Other Cancer Father         Lymphoma       SOCIAL HISTORY:   Social History      Socioeconomic History     Marital status: Single     Highest education level: Some college, no degree   Tobacco Use     Smoking status: Never     Smokeless tobacco: Never   Vaping Use     Vaping Use: Never used   Substance and Sexual Activity     Alcohol use: Not Currently     Comment: Social drinking once in a while.     Drug use: No     Sexual activity: Never   Other Topics Concern     Parent/sibling w/ CABG, MI or angioplasty before 65F 55M? No       Review Of Systems:  Skin: No rash, pruritis, or skin pigmentation  Eyes: No changes in vision  Ears/Nose/Throat: No changes in hearing, no nosebleeds  Respiratory: No shortness of breath, dyspnea on exertion, cough, or hemoptysis  Cardiovascular: No chest pain or palpitations  Gastrointestinal: No diarrhea or constipation. No abdominal pain. No hematochezia  Genitourinary: see HPI  Musculoskeletal: No pain or swelling of joints, normal range of motion  Neurologic: No weakness or tremors  Psychiatric: No recent changes in memory or mood  Hematologic/Lymphatic/Immunologic: No easy bruising or enlarged lymph nodes  Endocrine: No weight gain or loss      PHYSICAL EXAM:    There were no vitals taken for this visit.    Constitutional: Well developed. Conversant and in no acute distress  Eyes: Anicteric sclera, conjunctiva clear, normal extraocular movements  ENT: Normocephalic and atraumatic,   Skin: Warm and dry. No rashes or lesions  Cardiac: No peripheral edema  Back/Flank: Not done  CNS/PNS: Normal musculature and movements, moves all extremities normally  Respiratory: Normal non-labored breathing  Abdomen: Soft nontender and nondistended  Peripheral Vascular: No peripheral edema  Mental Status/Psych: Alert and Oriented x 3. Normal mood and affect    Penis: Not done  Scrotal Skin: Not done  Testicles: Not done  Epididymis: Not done  Digital Rectal Exam:     Cystoscopy: I performed flexible cystoscopy and initially the urine was quite cloudy and there were clots in  the dependent portion of the bladder.  I temporarily placed a 20 Beninese coudé tip Raygoza catheter, hand irrigated out the clots, and then removed the Raygoza catheter.    I reinserted the cystoscope and there was some edema in the bladder from the catheter but otherwise no tumors identified throughout the bladder.  On retroflexion scope he had a circumferential large intravesical segment of the prostate.  Back the scope he had kissing lateral lobes.    Imaging: None    Urinalysis: UA RESULTS:  Recent Labs   Lab Test 01/06/23  1439 01/06/23  1246   COLOR Light Yellow Yellow   APPEARANCE Slightly Cloudy* Clear   URINEGLC Negative Negative   URINEBILI Negative Negative   URINEKETONE Negative Negative   SG 1.009 1.020   UBLD Moderate* Negative   URINEPH 8.0* 7.0   PROTEIN 10* Negative   UROBILINOGEN  --  0.2   NITRITE Negative Negative   LEUKEST Negative Negative   RBCU 37*  --    WBCU 6*  --        PSA: Last PSA was 15.7 in 2020    Post Void Residual:     Other labs: None today      IMPRESSION:  Enlarged prostate  Urinary retention  Elevated PSA    PLAN:  He has a significantly enlarged prostate leading to urinary retention.  At this time he is self catheterizing without difficulty.  He is not voiding between catheterizations but he is having encouraged to urinate.  He may consider a BPH procedure.  Retention does not improve.    It has been 2 years since his last PSA was checked, and at that time it was elevated beyond previous.  I recommended PSA checked today.  It may be falsely elevated today due to catheter manipulation but I would like to get an updated PSA.    I will call him when the PSA results are available.  The PSA has improved we may be able to consider a BPH procedure.  If it has not improved we may need to recheck PSA again in the near future.    Total time spent today in review of outside records and test results, discussion with the patient, and documentation: 30 minutes      Chaparro Patterson M.D.

## 2023-02-03 NOTE — NURSING NOTE
Chief Complaint   Patient presents with     Urinary Retention     Pt here for in office cystoscopy     Prior to the start of the procedure and with procedural staff participation, I verbally confirmed the patient s identity using two indicators, relevant allergies, that the procedure was appropriate and matched the consent or emergent situation, and that the correct equipment/implants were available. Immediately prior to starting the procedure I conducted the Time Out with the procedural staff and re-confirmed the patient s name, procedure, and site/side. I have wiped the patient off with the povidone-Iodine solution, draped them,  used Lidocaine hydrochloride jelly, and instilled sterile water into the bladder. (The Joint Commission universal protocol was followed.)  Yes    Sedation (Moderate or Deep): None    5mL 2% lidocaine hydrochloride Urojet instilled into urethra.    NDC# [address]-3  Lot #: [id number]  Expiration Date:  08/24    Khushbu Jackson CMA

## 2023-02-03 NOTE — PROGRESS NOTES
Office Visit Note  [facility]  [phone]    UROLOGIC DIAGNOSES:   Enlarged prostate  Urinary retention  Elevated PSA    CURRENT INTERVENTIONS:   Negative prostate biopsy x2  MRI prostate 2015  Self-catheterization    HISTORY:   Chris was seen in the urology clinic earlier this month with difficulty urinating.  He had a Raygoza catheter placed for 1800mL of urine output.  He was started on Flomax at that time.  He had his catheter removed 2 days ago and has been performing self-catheterization.  He has been self catheterizing 4 times daily without difficulty but is not urinating in between catheterizations.  He says he does get an urge to urinate.      PAST MEDICAL HISTORY:   Past Medical History:   Diagnosis Date     Anxiety 3/23/2022     Benign prostatic hyperplasia with nocturia 3/23/2022     Enlarged prostate     per pt reprt     Hypertension      Prostate cancer (H) 3/23/2022     PSA elevation        PAST SURGICAL HISTORY:   Past Surgical History:   Procedure Laterality Date     BIOPSY  06/12/12    Prostate - High PSA - Benign     COLONOSCOPY  7/24/2013    Dr. Craig [facility]     COLONOSCOPY  7/24/2013    Procedure: COMBINED COLONOSCOPY, SINGLE BIOPSY/POLYPECTOMY BY BIOPSY;  Colonoscopy with polypectomy using cold forceps.;  Surgeon: Zach Craig MD;  Location:  GI       FAMILY HISTORY:   Family History   Problem Relation Age of Onset     Hypertension Mother         Dementia     Alzheimer Disease Mother         dementia     Cancer Father 64        lymphoma cancer     Heart Disease Father         triple by-pass     Other Cancer Father         Lymphoma       SOCIAL HISTORY:   Social History     Socioeconomic History     Marital status: Single     Highest education level: Some college, no degree   Tobacco Use     Smoking status: Never     Smokeless tobacco: Never   Vaping Use     Vaping Use: Never used   Substance and Sexual Activity     Alcohol use: Not Currently     Comment: Social drinking once in a  while.     Drug use: No     Sexual activity: Never   Other Topics Concern     Parent/sibling w/ CABG, MI or angioplasty before 65F 55M? No       Review Of Systems:  Skin: No rash, pruritis, or skin pigmentation  Eyes: No changes in vision  Ears/Nose/Throat: No changes in hearing, no nosebleeds  Respiratory: No shortness of breath, dyspnea on exertion, cough, or hemoptysis  Cardiovascular: No chest pain or palpitations  Gastrointestinal: No diarrhea or constipation. No abdominal pain. No hematochezia  Genitourinary: see HPI  Musculoskeletal: No pain or swelling of joints, normal range of motion  Neurologic: No weakness or tremors  Psychiatric: No recent changes in memory or mood  Hematologic/Lymphatic/Immunologic: No easy bruising or enlarged lymph nodes  Endocrine: No weight gain or loss      PHYSICAL EXAM:    There were no vitals taken for this visit.    Constitutional: Well developed. Conversant and in no acute distress  Eyes: Anicteric sclera, conjunctiva clear, normal extraocular movements  ENT: Normocephalic and atraumatic,   Skin: Warm and dry. No rashes or lesions  Cardiac: No peripheral edema  Back/Flank: Not done  CNS/PNS: Normal musculature and movements, moves all extremities normally  Respiratory: Normal non-labored breathing  Abdomen: Soft nontender and nondistended  Peripheral Vascular: No peripheral edema  Mental Status/Psych: Alert and Oriented x 3. Normal mood and affect    Penis: Not done  Scrotal Skin: Not done  Testicles: Not done  Epididymis: Not done  Digital Rectal Exam:     Cystoscopy: I performed flexible cystoscopy and initially the urine was quite cloudy and there were clots in the dependent portion of the bladder.  I temporarily placed a 20 Hebrew coudé tip Raygoza catheter, hand irrigated out the clots, and then removed the Raygoza catheter.    I reinserted the cystoscope and there was some edema in the bladder from the catheter but otherwise no tumors identified throughout the bladder.  On  retroflexion scope he had a circumferential large intravesical segment of the prostate.  Back the scope he had kissing lateral lobes.    Imaging: None    Urinalysis: UA RESULTS:  Recent Labs   Lab Test 01/06/23  1439 01/06/23  1246   COLOR Light Yellow Yellow   APPEARANCE Slightly Cloudy* Clear   URINEGLC Negative Negative   URINEBILI Negative Negative   URINEKETONE Negative Negative   SG 1.009 1.020   UBLD Moderate* Negative   URINEPH 8.0* 7.0   PROTEIN 10* Negative   UROBILINOGEN  --  0.2   NITRITE Negative Negative   LEUKEST Negative Negative   RBCU 37*  --    WBCU 6*  --        PSA: Last PSA was 15.7 in 2020    Post Void Residual:     Other labs: None today      IMPRESSION:  Enlarged prostate  Urinary retention  Elevated PSA    PLAN:  He has a significantly enlarged prostate leading to urinary retention.  At this time he is self catheterizing without difficulty.  He is not voiding between catheterizations but he is having encouraged to urinate.  He may consider a BPH procedure.  Retention does not improve.    It has been 2 years since his last PSA was checked, and at that time it was elevated beyond previous.  I recommended PSA checked today.  It may be falsely elevated today due to catheter manipulation but I would like to get an updated PSA.    I will call him when the PSA results are available.  The PSA has improved we may be able to consider a BPH procedure.  If it has not improved we may need to recheck PSA again in the near future.    Total time spent today in review of outside records and test results, discussion with the patient, and documentation: 30 minutes      Chaparro Patterson M.D.

## 2023-02-06 LAB
ALBUMIN UR-MCNC: 30 MG/DL
APPEARANCE UR: ABNORMAL
BILIRUB UR QL STRIP: NEGATIVE
COLOR UR AUTO: YELLOW
GLUCOSE UR STRIP-MCNC: NEGATIVE MG/DL
HGB UR QL STRIP: ABNORMAL
KETONES UR STRIP-MCNC: NEGATIVE MG/DL
LEUKOCYTE ESTERASE UR QL STRIP: NEGATIVE
NITRATE UR QL: NEGATIVE
PH UR STRIP: 7 [PH] (ref 5–7)
SP GR UR STRIP: 1.02 (ref 1–1.03)
UROBILINOGEN UR STRIP-ACNC: 0.2 E.U./DL

## 2023-02-06 PROCEDURE — 81003 URINALYSIS AUTO W/O SCOPE: CPT | Mod: QW

## 2023-03-29 ENCOUNTER — TELEPHONE (OUTPATIENT)
Dept: UROLOGY | Facility: CLINIC | Age: 78
End: 2023-03-29

## 2023-03-29 NOTE — TELEPHONE ENCOUNTER
[facility] Call Center    Phone Message    May a detailed message be left on voicemail: yes     Reason for Call: Other: . pt is calling- he had an appt back on 2/3/2023 and pt thought he was supposed to get a call to set up a procedure- please call Chris, thank you    Action Taken: Message routed to:  Other: uro    Travel Screening: Not Applicable

## 2023-04-14 ENCOUNTER — VIRTUAL VISIT (OUTPATIENT)
Dept: UROLOGY | Facility: CLINIC | Age: 78
End: 2023-04-14
Payer: MEDICARE

## 2023-04-14 DIAGNOSIS — N40.0 ENLARGED PROSTATE: Primary | ICD-10-CM

## 2023-04-14 PROCEDURE — 99442 PR PHYSICIAN TELEPHONE EVALUATION 11-20 MIN: CPT | Performed by: UROLOGY

## 2023-04-14 RX ORDER — CEFAZOLIN SODIUM 2 G/50ML
2 SOLUTION INTRAVENOUS
Status: CANCELLED | OUTPATIENT
Start: 2023-04-14

## 2023-04-14 RX ORDER — CEFAZOLIN SODIUM 2 G/50ML
2 SOLUTION INTRAVENOUS SEE ADMIN INSTRUCTIONS
Status: CANCELLED | OUTPATIENT
Start: 2023-04-14

## 2023-04-14 NOTE — LETTER
4/14/2023       RE: Paul Smith  [address] [address]     Dear Colleague,    Thank you for referring your patient, Paul Smith, to the [facility] [place] [facility]. Please see a copy of my visit note below.    **TEXT LINK TO CELL,  [phone] **    Chris is a 77 year old who is being evaluated via a billable video visit.      How would you like to obtain your AVS? MyChart  If the video visit is dropped, the invitation should be resent by: Text to cell phone: [phone]  Will anyone else be joining your video visit? No        Office Visit Note  [facility]  [phone]    UROLOGIC DIAGNOSES:   Enlarged prostate  Urinary retention  Elevated PSA    CURRENT INTERVENTIONS:   Negative prostate biopsy x2  MRI 2015  Self-catheterization    HISTORY:   Chris had his PSA rechecked and it remained elevated but improved down to 8.49.  This is about the same level when PSA was 1 he had his MRI of the prostate performed in 2015.    He began urinating again on Feb 19 and has not needed to use the catheter again since that time.  He really has no urinary symptoms or complaints at this time.  He has not checked a postvoid residual in the past 2 months.  Despite him feeling well at this time he wishes to undergo a surgery to prevent future episodes of retention.      PAST MEDICAL HISTORY:   Past Medical History:   Diagnosis Date    Anxiety 3/23/2022    Benign prostatic hyperplasia with nocturia 3/23/2022    Enlarged prostate     per pt reprt    Hypertension     Prostate cancer (H) 3/23/2022    PSA elevation        PAST SURGICAL HISTORY:   Past Surgical History:   Procedure Laterality Date    BIOPSY  06/12/12    Prostate - High PSA - Benign    COLONOSCOPY  7/24/2013    Dr. Craig [facility]    COLONOSCOPY  7/24/2013    Procedure: COMBINED COLONOSCOPY, SINGLE BIOPSY/POLYPECTOMY BY BIOPSY;  Colonoscopy with polypectomy using cold  forceps.;  Surgeon: Zach Craig MD;  Location:  GI       FAMILY HISTORY:   Family History   Problem Relation Age of Onset    Hypertension Mother         Dementia    Alzheimer Disease Mother         dementia    Cancer Father 64        lymphoma cancer    Heart Disease Father         triple by-pass    Other Cancer Father         Lymphoma       SOCIAL HISTORY:   Social History     Tobacco Use    Smoking status: Never    Smokeless tobacco: Never   Vaping Use    Vaping status: Never Used   Substance Use Topics    Alcohol use: Not Currently     Comment: Social drinking once in a while.       REVIEW OF SYSTEMS:  Skin: No rash, pruritis, or skin pigmentation  Eyes: No changes in vision  Ears/Nose/Throat: No changes in hearing, no nosebleeds  Respiratory: No shortness of breath, dyspnea on exertion, cough, or hemoptysis  Cardiovascular: No chest pain or palpitations  Gastrointestinal: No diarrhea or constipation. No abdominal pain. No hematochezia  Genitourinary: see HPI  Musculoskeletal: No pain or swelling of joints, normal range of motion  Neurologic: No weakness or tremors  Psychiatric: No recent changes in memory or mood  Hematologic/Lymphatic/Immunologic: No easy bruising or enlarged lymph nodes  Endocrine: No weight gain or loss      PHYSICAL EXAM:    General: Alert and oriented to time, place, and self. In NAD   HEENT: Head AT/NC, EOMI, CN Grossly intact   Lungs: no respiratory distress, or pursed lip breathing   Heart: No obvious jugular venous distension present   Musculoskeltal: Normal movements. Normal appearing musculature  Skin: no suspicious lesions or rashes   Neuro: Alert, oriented, speech and mentation normal; moving all 4 extremities equally.   Psych: affect and mood normal    Imaging: None    Urinalysis: UA RESULTS:  Recent Labs   Lab Test 02/06/23  0800 01/06/23  1439   COLOR Yellow Light Yellow   APPEARANCE Slightly Cloudy* Slightly Cloudy*   URINEGLC Negative Negative   URINEBILI Negative  Negative   URINEKETONE Negative Negative   SG 1.025 1.009   UBLD Large* Moderate*   URINEPH 7.0 8.0*   PROTEIN 30* 10*   UROBILINOGEN 0.2  --    NITRITE Negative Negative   LEUKEST Negative Negative   RBCU  --  37*   WBCU  --  6*       PSA: 8.49    Post Void Residual:     Other labs: None today      IMPRESSION:  Enlarged prostate  Elevated PSA  History of retention    PLAN:  It appears that his urinary retention has resolved but I did recommend that he come into clinic for urinalysis and bladder scan to check a true postvoid residual.    We discussed options and he wishes to undergo a surgery, as we have discussed before, prevent future episodes of retention.  We discussed surgical treatment options.  Given the size and anatomy of his prostate I would recommend a transurethral resection of the prostate.  We discussed TURP in detail today along with its risks and expected recovery.  He wishes to proceed.    We will check a postvoid residual in clinic in the near future and also get him scheduled for a TURP.  He will need to be admitted to the hospital postoperatively.    We also discussed his elevated PSA.  I counseled him that he certainly could have a prostate cancer present.  However, he is also beyond the normally recommended screening age for prostate cancer and his PSA is stable as compared to 2015, with his MRI of the prostate was negative.  We discussed potential further work-up for prostate cancer before surgery but he declined, and I think this is reasonable at this time.      Chaparro Patterson M.D.            Total telephone time: 15 minutes

## 2023-04-14 NOTE — PROGRESS NOTES
**TEXT LINK TO CELL,  [phone] **    Chris is a 77 year old who is being evaluated via a billable video visit.      How would you like to obtain your AVS? MyChart  If the video visit is dropped, the invitation should be resent by: Text to cell phone: [phone]  Will anyone else be joining your video visit? No        Office Visit Note  [facility]  [phone]    UROLOGIC DIAGNOSES:   Enlarged prostate  Urinary retention  Elevated PSA    CURRENT INTERVENTIONS:   Negative prostate biopsy x2  MRI 2015  Self-catheterization    HISTORY:   Chris had his PSA rechecked and it remained elevated but improved down to 8.49.  This is about the same level when PSA was 1 he had his MRI of the prostate performed in 2015.    He began urinating again on Feb 19 and has not needed to use the catheter again since that time.  He really has no urinary symptoms or complaints at this time.  He has not checked a postvoid residual in the past 2 months.  Despite him feeling well at this time he wishes to undergo a surgery to prevent future episodes of retention.      PAST MEDICAL HISTORY:   Past Medical History:   Diagnosis Date     Anxiety 3/23/2022     Benign prostatic hyperplasia with nocturia 3/23/2022     Enlarged prostate     per pt reprt     Hypertension      Prostate cancer (H) 3/23/2022     PSA elevation        PAST SURGICAL HISTORY:   Past Surgical History:   Procedure Laterality Date     BIOPSY  06/12/12    Prostate - High PSA - Benign     COLONOSCOPY  7/24/2013    Dr. Craig [facility]     COLONOSCOPY  7/24/2013    Procedure: COMBINED COLONOSCOPY, SINGLE BIOPSY/POLYPECTOMY BY BIOPSY;  Colonoscopy with polypectomy using cold forceps.;  Surgeon: Zach Craig MD;  Location:  GI       FAMILY HISTORY:   Family History   Problem Relation Age of Onset     Hypertension Mother         Dementia     Alzheimer Disease Mother         dementia     Cancer Father 64        lymphoma cancer     Heart Disease Father         triple  by-pass     Other Cancer Father         Lymphoma       SOCIAL HISTORY:   Social History     Tobacco Use     Smoking status: Never     Smokeless tobacco: Never   Vaping Use     Vaping status: Never Used   Substance Use Topics     Alcohol use: Not Currently     Comment: Social drinking once in a while.       REVIEW OF SYSTEMS:  Skin: No rash, pruritis, or skin pigmentation  Eyes: No changes in vision  Ears/Nose/Throat: No changes in hearing, no nosebleeds  Respiratory: No shortness of breath, dyspnea on exertion, cough, or hemoptysis  Cardiovascular: No chest pain or palpitations  Gastrointestinal: No diarrhea or constipation. No abdominal pain. No hematochezia  Genitourinary: see HPI  Musculoskeletal: No pain or swelling of joints, normal range of motion  Neurologic: No weakness or tremors  Psychiatric: No recent changes in memory or mood  Hematologic/Lymphatic/Immunologic: No easy bruising or enlarged lymph nodes  Endocrine: No weight gain or loss      PHYSICAL EXAM:    General: Alert and oriented to time, place, and self. In NAD   HEENT: Head AT/NC, EOMI, CN Grossly intact   Lungs: no respiratory distress, or pursed lip breathing   Heart: No obvious jugular venous distension present   Musculoskeltal: Normal movements. Normal appearing musculature  Skin: no suspicious lesions or rashes   Neuro: Alert, oriented, speech and mentation normal; moving all 4 extremities equally.   Psych: affect and mood normal    Imaging: None    Urinalysis: UA RESULTS:  Recent Labs   Lab Test 02/06/23  0800 01/06/23  1439   COLOR Yellow Light Yellow   APPEARANCE Slightly Cloudy* Slightly Cloudy*   URINEGLC Negative Negative   URINEBILI Negative Negative   URINEKETONE Negative Negative   SG 1.025 1.009   UBLD Large* Moderate*   URINEPH 7.0 8.0*   PROTEIN 30* 10*   UROBILINOGEN 0.2  --    NITRITE Negative Negative   LEUKEST Negative Negative   RBCU  --  37*   WBCU  --  6*       PSA: 8.49    Post Void Residual:     Other labs: None  today      IMPRESSION:  Enlarged prostate  Elevated PSA  History of retention    PLAN:  It appears that his urinary retention has resolved but I did recommend that he come into clinic for urinalysis and bladder scan to check a true postvoid residual.    We discussed options and he wishes to undergo a surgery, as we have discussed before, prevent future episodes of retention.  We discussed surgical treatment options.  Given the size and anatomy of his prostate I would recommend a transurethral resection of the prostate.  We discussed TURP in detail today along with its risks and expected recovery.  He wishes to proceed.    We will check a postvoid residual in clinic in the near future and also get him scheduled for a TURP.  He will need to be admitted to the hospital postoperatively.    We also discussed his elevated PSA.  I counseled him that he certainly could have a prostate cancer present.  However, he is also beyond the normally recommended screening age for prostate cancer and his PSA is stable as compared to 2015, with his MRI of the prostate was negative.  We discussed potential further work-up for prostate cancer before surgery but he declined, and I think this is reasonable at this time.      Chaparro Patterson M.D.            Total telephone time: 15 minutes

## 2023-04-14 NOTE — NURSING NOTE
Chief Complaint   Patient presents with     Benign Prostatic Hypertrophy       Julia Hartman, EMT

## 2023-04-17 ENCOUNTER — TELEPHONE (OUTPATIENT)
Dept: UROLOGY | Facility: CLINIC | Age: 78
End: 2023-04-17
Payer: MEDICARE

## 2023-04-19 ENCOUNTER — MYC MEDICAL ADVICE (OUTPATIENT)
Dept: FAMILY MEDICINE | Facility: CLINIC | Age: 78
End: 2023-04-19
Payer: MEDICARE

## 2023-04-19 NOTE — TELEPHONE ENCOUNTER
See pt MyChart message  -Pt needing to schedule pre-op appt, urology procedure scheduled 6/2/23      Patient Quality Outreach Health Maintenance - RN PAL    Summary:    MEAGAN RN attempted (attempt #1) to contact pt regarding overdue health maintenance    Patient is due/failing the following:   Physical Annual Wellness Visit    Health Maintenance Due   Topic Date Due     MEDICARE ANNUAL WELLNESS VISIT  02/08/2022     ANNUAL REVIEW OF HM ORDERS  01/27/2023     Type of outreach:    Phone, left message for patient/parent to call back.  -Left VM requesting call back [phone], awaiting call back at this time  -Sent [employer] message advised pt schedule pre-op appt no more than 30 days prior to scheduled surgery date, advised pt to schedule AWV - overdue     PAL will attempt to contact pt again tomorrow 4/20 if no response    Questions for provider review:    None                                                                                       Antonina MARROQUIN RN  Patient Advocate Liaison - PAL RN  [facility]  [phone]

## 2023-04-20 NOTE — TELEPHONE ENCOUNTER
Patient Quality Outreach Health Maintenance - PAL RN    Summary:    PAL RN contacted pt regarding scheduling pre-op appt overdue health maintenance     Patient is due/failing the following:   Physical Annual Wellness Visit    Health Maintenance Due   Topic Date Due     MEDICARE ANNUAL WELLNESS VISIT  02/08/2022     ANNUAL REVIEW OF HM ORDERS  01/27/2023       Type of outreach:    Phone, spoke to patient/parent.      -Pt has TURP procedure schedule with urology 6/2/23  -Scheduled pt for pre-op appt with Dr. Menjivar 5/9/23 at 10:30am  -Scheduled pt for annual wellness visit with Dr. Menjivar 5/23/23 at 9:30am  -Introduced as assigned PAL, educated on role, provided direct line [phone] for future contact    Patient was given an opportunity to ask questions, verbalized understanding of plan, and is agreeable.    Questions for provider review:    None                                                                                       Antonina MARROQUIN RN  Patient Advocate Liaison - PAL RN  [facility]  [phone]

## 2023-05-06 SDOH — HEALTH STABILITY: PHYSICAL HEALTH: ON AVERAGE, HOW MANY DAYS PER WEEK DO YOU ENGAGE IN MODERATE TO STRENUOUS EXERCISE (LIKE A BRISK WALK)?: 7 DAYS

## 2023-05-06 SDOH — ECONOMIC STABILITY: FOOD INSECURITY: WITHIN THE PAST 12 MONTHS, THE FOOD YOU BOUGHT JUST DIDN'T LAST AND YOU DIDN'T HAVE MONEY TO GET MORE.: NEVER TRUE

## 2023-05-06 SDOH — HEALTH STABILITY: PHYSICAL HEALTH: ON AVERAGE, HOW MANY MINUTES DO YOU ENGAGE IN EXERCISE AT THIS LEVEL?: 30 MIN

## 2023-05-06 SDOH — ECONOMIC STABILITY: TRANSPORTATION INSECURITY
IN THE PAST 12 MONTHS, HAS THE LACK OF TRANSPORTATION KEPT YOU FROM MEDICAL APPOINTMENTS OR FROM GETTING MEDICATIONS?: NO

## 2023-05-06 SDOH — ECONOMIC STABILITY: INCOME INSECURITY: IN THE LAST 12 MONTHS, WAS THERE A TIME WHEN YOU WERE NOT ABLE TO PAY THE MORTGAGE OR RENT ON TIME?: NO

## 2023-05-06 SDOH — ECONOMIC STABILITY: FOOD INSECURITY: WITHIN THE PAST 12 MONTHS, YOU WORRIED THAT YOUR FOOD WOULD RUN OUT BEFORE YOU GOT MONEY TO BUY MORE.: NEVER TRUE

## 2023-05-06 SDOH — ECONOMIC STABILITY: INCOME INSECURITY: HOW HARD IS IT FOR YOU TO PAY FOR THE VERY BASICS LIKE FOOD, HOUSING, MEDICAL CARE, AND HEATING?: NOT HARD AT ALL

## 2023-05-06 SDOH — ECONOMIC STABILITY: TRANSPORTATION INSECURITY
IN THE PAST 12 MONTHS, HAS LACK OF TRANSPORTATION KEPT YOU FROM MEETINGS, WORK, OR FROM GETTING THINGS NEEDED FOR DAILY LIVING?: NO

## 2023-05-06 ASSESSMENT — LIFESTYLE VARIABLES
HOW OFTEN DO YOU HAVE A DRINK CONTAINING ALCOHOL: MONTHLY OR LESS
HOW MANY STANDARD DRINKS CONTAINING ALCOHOL DO YOU HAVE ON A TYPICAL DAY: PATIENT DOES NOT DRINK
AUDIT-C TOTAL SCORE: 1
SKIP TO QUESTIONS 9-10: 1
HOW OFTEN DO YOU HAVE SIX OR MORE DRINKS ON ONE OCCASION: NEVER

## 2023-05-06 ASSESSMENT — SOCIAL DETERMINANTS OF HEALTH (SDOH)
HOW OFTEN DO YOU GET TOGETHER WITH FRIENDS OR RELATIVES?: ONCE A WEEK
IN A TYPICAL WEEK, HOW MANY TIMES DO YOU TALK ON THE PHONE WITH FAMILY, FRIENDS, OR NEIGHBORS?: ONCE A WEEK
HOW OFTEN DO YOU ATTEND CHURCH OR RELIGIOUS SERVICES?: 1 TO 4 TIMES PER YEAR
ARE YOU MARRIED, WIDOWED, DIVORCED, SEPARATED, NEVER MARRIED, OR LIVING WITH A PARTNER?: NEVER MARRIED
DO YOU BELONG TO ANY CLUBS OR ORGANIZATIONS SUCH AS CHURCH GROUPS UNIONS, FRATERNAL OR ATHLETIC GROUPS, OR SCHOOL GROUPS?: NO

## 2023-05-09 ENCOUNTER — MYC MEDICAL ADVICE (OUTPATIENT)
Dept: FAMILY MEDICINE | Facility: CLINIC | Age: 78
End: 2023-05-09

## 2023-05-09 ENCOUNTER — OFFICE VISIT (OUTPATIENT)
Dept: FAMILY MEDICINE | Facility: CLINIC | Age: 78
End: 2023-05-09
Payer: MEDICARE

## 2023-05-09 VITALS
RESPIRATION RATE: 18 BRPM | TEMPERATURE: 97.5 F | OXYGEN SATURATION: 99 % | HEIGHT: 67 IN | WEIGHT: 131 LBS | DIASTOLIC BLOOD PRESSURE: 87 MMHG | SYSTOLIC BLOOD PRESSURE: 141 MMHG | HEART RATE: 73 BPM | BODY MASS INDEX: 20.56 KG/M2

## 2023-05-09 DIAGNOSIS — Z01.818 PREOP GENERAL PHYSICAL EXAM: Primary | ICD-10-CM

## 2023-05-09 DIAGNOSIS — I10 ESSENTIAL HYPERTENSION, BENIGN: ICD-10-CM

## 2023-05-09 LAB
BASOPHILS # BLD AUTO: 0 10E3/UL (ref 0–0.2)
BASOPHILS NFR BLD AUTO: 0 %
EOSINOPHIL # BLD AUTO: 0.1 10E3/UL (ref 0–0.7)
EOSINOPHIL NFR BLD AUTO: 1 %
ERYTHROCYTE [DISTWIDTH] IN BLOOD BY AUTOMATED COUNT: 14.6 % (ref 10–15)
HCT VFR BLD AUTO: 39.7 % (ref 40–53)
HGB BLD-MCNC: 13.1 G/DL (ref 13.3–17.7)
LYMPHOCYTES # BLD AUTO: 1.4 10E3/UL (ref 0.8–5.3)
LYMPHOCYTES NFR BLD AUTO: 19 %
MCH RBC QN AUTO: 27.1 PG (ref 26.5–33)
MCHC RBC AUTO-ENTMCNC: 33 G/DL (ref 31.5–36.5)
MCV RBC AUTO: 82 FL (ref 78–100)
MONOCYTES # BLD AUTO: 0.5 10E3/UL (ref 0–1.3)
MONOCYTES NFR BLD AUTO: 7 %
NEUTROPHILS # BLD AUTO: 5.7 10E3/UL (ref 1.6–8.3)
NEUTROPHILS NFR BLD AUTO: 74 %
PLATELET # BLD AUTO: 251 10E3/UL (ref 150–450)
RBC # BLD AUTO: 4.84 10E6/UL (ref 4.4–5.9)
WBC # BLD AUTO: 7.7 10E3/UL (ref 4–11)

## 2023-05-09 PROCEDURE — 93000 ELECTROCARDIOGRAM COMPLETE: CPT | Performed by: FAMILY MEDICINE

## 2023-05-09 PROCEDURE — 85025 COMPLETE CBC W/AUTO DIFF WBC: CPT | Performed by: FAMILY MEDICINE

## 2023-05-09 PROCEDURE — 36415 COLL VENOUS BLD VENIPUNCTURE: CPT | Performed by: FAMILY MEDICINE

## 2023-05-09 PROCEDURE — 80048 BASIC METABOLIC PNL TOTAL CA: CPT | Performed by: FAMILY MEDICINE

## 2023-05-09 PROCEDURE — 99214 OFFICE O/P EST MOD 30 MIN: CPT | Performed by: FAMILY MEDICINE

## 2023-05-09 RX ORDER — LISINOPRIL 20 MG/1
20 TABLET ORAL DAILY
Qty: 90 TABLET | Refills: 1 | Status: SHIPPED | OUTPATIENT
Start: 2023-05-09

## 2023-05-09 RX ORDER — AMLODIPINE BESYLATE 5 MG/1
5 TABLET ORAL DAILY
Qty: 90 TABLET | Refills: 1 | Status: SHIPPED | OUTPATIENT
Start: 2023-05-09

## 2023-05-09 NOTE — TELEPHONE ENCOUNTER
See pt MyChart message    PAL replied to [employer] message  -Educated pt on the difference between pre-op physical and AWV  -Advised pt keep scheduled AWV for 5/23/23    Antonina MARROQUIN RN  Patient Advocate Liaison - PAL RN  [facility]  [phone]

## 2023-05-09 NOTE — PROGRESS NOTES
[facility]  [id number] [place] [id number]  Phone: [phone]  Primary Provider: Won Tam  Pre-op Performing Provider: WON TAM      PREOPERATIVE EVALUATION:  Today's date: 5/9/2023    Paul Smith is a 77 year old male who presents for a preoperative evaluation.      5/9/2023    10:10 AM   Additional Questions   Roomed by Nai Marie     Surgical Information:  Surgery/Procedure: CYSTOSCOPY, WITH TRANSURETHRAL RESECTION PROSTATE  Surgery Location: [facility]  Surgeon: Chaparro Patterson MD  Surgery Date: 6/2/2023  Time of Surgery: 1:10 PM  Where patient plans to recover: At home alone next door neighbor will check in on patient.   Fax number for surgical facility:         Subjective     HPI related to upcoming procedure: History BPH, urinary retention        5/6/2023    12:31 PM   Preop Questions   1. Have you ever had a heart attack or stroke? No   2. Have you ever had surgery on your heart or blood vessels, such as a stent placement, a coronary artery bypass, or surgery on an artery in your head, neck, heart, or legs? No   3. Do you have chest pain with activity? No   4. Do you have a history of  heart failure? No   5. Do you currently have a cold, bronchitis or symptoms of other infection? No   6. Do you have a cough, shortness of breath, or wheezing? No   7. Do you or anyone in your family have previous history of blood clots? No   8. Do you or does anyone in your family have a serious bleeding problem such as prolonged bleeding following surgeries or cuts? No   9. Have you ever had problems with anemia or been told to take iron pills? No   10. Have you had any abnormal blood loss such as black, tarry or bloody stools? No   11. Have you ever had a blood transfusion? No   12. Are you willing to have a blood transfusion if it is medically needed before, during, or after your surgery? Yes   13. Have you or any of your  relatives ever had problems with anesthesia? No   14. Do you have sleep apnea, excessive snoring or daytime drowsiness? No   15. Do you have any artifical heart valves or other implanted medical devices like a pacemaker, defibrillator, or continuous glucose monitor? No   16. Do you have artificial joints? No   17. Are you allergic to latex? No       Health Care Directive:  Patient has a Health Care Directive on file      Preoperative Review of :   reviewed - no record of controlled substances prescribed.          Review of Systems  CONSTITUTIONAL: NEGATIVE for fever, chills, change in weight  ENT/MOUTH: NEGATIVE for ear, mouth and throat problems  RESP: NEGATIVE for significant cough or SOB  CV: NEGATIVE for chest pain, palpitations or peripheral edema  HEME/ALLERGY/IMMUNE: NEGATIVE for bleeding problems    Patient Active Problem List    Diagnosis Date Noted     Preop general physical exam 05/09/2023     Priority: Medium     Benign prostatic hyperplasia with nocturia 03/23/2022     Priority: Medium     Special screening for malignant neoplasm of prostate 03/23/2022     Priority: Medium     BENIGN HYPERTENSION 03/23/2022     Priority: Medium     Anxiety 03/23/2022     Priority: Medium     Retina disorder, right 01/18/2018     Priority: Medium     Dr yesy Prasad [facility]       AK (actinic keratosis) 01/18/2018     Priority: Medium     Dr Ham [facility]       Sebaceous cyst 01/18/2018     Priority: Medium     ACP (advance care planning) 07/22/2017     Priority: Medium     Recommend Code Status in chart and patient's Advance Care Planning documents be reviewed to ensure alignment with patient's current wishes.  Most recent Advance Care Planning document is a Health Care Directive dated 7/17/16. Jo Ann Luz RN, Advance Care Planning Liaison.  7/22/17       Elevated prostate specific antigen (PSA) 07/12/2012     Priority: Medium     Dr trujillo       CARDIOVASCULAR SCREENING; LDL GOAL LESS  "THAN 130 10/31/2010     Priority: Medium      Past Medical History:   Diagnosis Date     Anxiety 3/23/2022     Benign prostatic hyperplasia with nocturia 3/23/2022     Enlarged prostate     per pt reprt     Hypertension      Prostate cancer (H) 3/23/2022     PSA elevation      Past Surgical History:   Procedure Laterality Date     BIOPSY  06/12/12    Prostate - High PSA - Benign     COLONOSCOPY  7/24/2013    Dr. Craig [facility]     COLONOSCOPY  7/24/2013    Procedure: COMBINED COLONOSCOPY, SINGLE BIOPSY/POLYPECTOMY BY BIOPSY;  Colonoscopy with polypectomy using cold forceps.;  Surgeon: Zach Craig MD;  Location:  GI     Current Outpatient Medications   Medication Sig Dispense Refill     amLODIPine (NORVASC) 5 MG tablet Take 1 tablet (5 mg) by mouth daily 90 tablet 1     ASPIRIN ADULT LOW STRENGTH PO Take 81 mg by mouth daily.       lisinopril (ZESTRIL) 20 MG tablet Take 1 tablet (20 mg) by mouth daily 90 tablet 1     tamsulosin (FLOMAX) 0.4 MG capsule Take 1 capsule (0.4 mg) by mouth daily 30 capsule 11       Allergies   Allergen Reactions     No Known Drug Allergy         Social History     Tobacco Use     Smoking status: Never     Smokeless tobacco: Never   Vaping Use     Vaping status: Never Used   Substance Use Topics     Alcohol use: Not Currently     Comment: Social drinking once in a while.       History   Drug Use No         Objective     BP (!) 141/87 (BP Location: Right arm, Patient Position: Sitting, Cuff Size: Adult Regular)   Pulse 73   Temp 97.5  F (36.4  C) (Oral)   Resp 18   Ht 1.702 m (5' 7\")   Wt 59.4 kg (131 lb)   SpO2 99%   BMI 20.52 kg/m      Physical Exam  Constitutional:       Appearance: Normal appearance.   HENT:      Head: Atraumatic.      Right Ear: Tympanic membrane normal.      Left Ear: Tympanic membrane normal.      Nose: Nose normal.      Mouth/Throat:      Mouth: Mucous membranes are moist.   Eyes:      Pupils: Pupils are equal, round, and reactive to light.   Cardiovascular: "      Rate and Rhythm: Normal rate and regular rhythm.      Heart sounds: Normal heart sounds.   Pulmonary:      Effort: Pulmonary effort is normal.      Breath sounds: Normal breath sounds.   Abdominal:      General: Abdomen is flat. Bowel sounds are normal.   Musculoskeletal:      Cervical back: Neck supple.   Skin:     General: Skin is warm and dry.   Neurological:      General: No focal deficit present.      Mental Status: He is alert.   Psychiatric:         Mood and Affect: Mood normal.           Recent Labs   Lab Test 01/06/23  1621   HGB 12.6*         POTASSIUM 3.7   CR 0.60*        Diagnostics:  Labs pending at this time.  Results will be reviewed when available.   EKG: sinus bradycardia, bifascicular block, no LVH by voltage criteria, unchanged from previous tracings    Revised Cardiac Risk Index (RCRI):  The patient has the following serious cardiovascular risks for perioperative complications:   - No serious cardiac risks = 0 points     RCRI Interpretation: 0 points: Class I (very low risk - 0.4% complication rate)  He will hold  Amlodipine AM of surgery, take all other medications         Signed Electronically by: Won Menjivar MD  Copy of this evaluation report is provided to requesting physician.

## 2023-05-10 LAB
ANION GAP SERPL CALCULATED.3IONS-SCNC: 10 MMOL/L (ref 7–15)
BUN SERPL-MCNC: 12.8 MG/DL (ref 8–23)
CALCIUM SERPL-MCNC: 9.4 MG/DL (ref 8.8–10.2)
CHLORIDE SERPL-SCNC: 102 MMOL/L (ref 98–107)
CREAT SERPL-MCNC: 0.76 MG/DL (ref 0.67–1.17)
DEPRECATED HCO3 PLAS-SCNC: 28 MMOL/L (ref 22–29)
GFR SERPL CREATININE-BSD FRML MDRD: >90 ML/MIN/1.73M2
GLUCOSE SERPL-MCNC: 102 MG/DL (ref 70–99)
POTASSIUM SERPL-SCNC: 4.3 MMOL/L (ref 3.4–5.3)
SODIUM SERPL-SCNC: 140 MMOL/L (ref 136–145)

## 2023-05-10 NOTE — RESULT ENCOUNTER NOTE
Pretty good. Anemia is almost normal, kidneys strong  Your anemia has not been evaluated, so I recommend rechecking in 6 months.  Won Menjivar MD

## 2023-05-18 ASSESSMENT — ENCOUNTER SYMPTOMS
JOINT SWELLING: 0
HEMATURIA: 0
MYALGIAS: 0
NERVOUS/ANXIOUS: 0
COUGH: 0
ABDOMINAL PAIN: 0
WEAKNESS: 0
PARESTHESIAS: 0
FREQUENCY: 1
HEMATOCHEZIA: 0
ARTHRALGIAS: 0
CHILLS: 0
SHORTNESS OF BREATH: 0
FEVER: 0
EYE PAIN: 0
SORE THROAT: 0
CONSTIPATION: 1
DIZZINESS: 0
HEADACHES: 0
PALPITATIONS: 0
NAUSEA: 0
DIARRHEA: 0
HEARTBURN: 0
DYSURIA: 0

## 2023-05-18 ASSESSMENT — ACTIVITIES OF DAILY LIVING (ADL): CURRENT_FUNCTION: NO ASSISTANCE NEEDED

## 2023-05-23 ENCOUNTER — OFFICE VISIT (OUTPATIENT)
Dept: FAMILY MEDICINE | Facility: CLINIC | Age: 78
End: 2023-05-23
Payer: MEDICARE

## 2023-05-23 VITALS
TEMPERATURE: 97.6 F | WEIGHT: 128.1 LBS | HEIGHT: 67 IN | OXYGEN SATURATION: 100 % | BODY MASS INDEX: 20.11 KG/M2 | DIASTOLIC BLOOD PRESSURE: 72 MMHG | SYSTOLIC BLOOD PRESSURE: 121 MMHG | HEART RATE: 68 BPM | RESPIRATION RATE: 14 BRPM

## 2023-05-23 DIAGNOSIS — I10 ESSENTIAL HYPERTENSION, BENIGN: ICD-10-CM

## 2023-05-23 DIAGNOSIS — N40.1 BENIGN PROSTATIC HYPERPLASIA WITH NOCTURIA: ICD-10-CM

## 2023-05-23 DIAGNOSIS — R73.09 ELEVATED GLUCOSE: ICD-10-CM

## 2023-05-23 DIAGNOSIS — Z00.00 WELLNESS EXAMINATION: Primary | ICD-10-CM

## 2023-05-23 DIAGNOSIS — K59.09 OTHER CONSTIPATION: ICD-10-CM

## 2023-05-23 DIAGNOSIS — H61.23 BILATERAL IMPACTED CERUMEN: ICD-10-CM

## 2023-05-23 DIAGNOSIS — R35.1 BENIGN PROSTATIC HYPERPLASIA WITH NOCTURIA: ICD-10-CM

## 2023-05-23 PROBLEM — Z12.5 SPECIAL SCREENING FOR MALIGNANT NEOPLASM OF PROSTATE: Status: RESOLVED | Noted: 2022-03-23 | Resolved: 2023-05-23

## 2023-05-23 LAB
HBA1C MFR BLD: 5.3 % (ref 0–5.6)
TSH SERPL DL<=0.005 MIU/L-ACNC: 2.04 UIU/ML (ref 0.3–4.2)

## 2023-05-23 PROCEDURE — G0439 PPPS, SUBSEQ VISIT: HCPCS | Performed by: FAMILY MEDICINE

## 2023-05-23 PROCEDURE — 84443 ASSAY THYROID STIM HORMONE: CPT | Performed by: FAMILY MEDICINE

## 2023-05-23 PROCEDURE — 36415 COLL VENOUS BLD VENIPUNCTURE: CPT | Performed by: FAMILY MEDICINE

## 2023-05-23 PROCEDURE — 83036 HEMOGLOBIN GLYCOSYLATED A1C: CPT | Performed by: FAMILY MEDICINE

## 2023-05-23 ASSESSMENT — ENCOUNTER SYMPTOMS
FREQUENCY: 1
MYALGIAS: 0
ARTHRALGIAS: 0
DIARRHEA: 0
SHORTNESS OF BREATH: 0
DIZZINESS: 0
PARESTHESIAS: 0
HEMATURIA: 0
SORE THROAT: 0
COUGH: 0
ABDOMINAL PAIN: 0
CONSTIPATION: 1
CHILLS: 0
PALPITATIONS: 0
HEARTBURN: 0
DYSURIA: 0
HEMATOCHEZIA: 0
NAUSEA: 0
EYE PAIN: 0
HEADACHES: 0
WEAKNESS: 0
FEVER: 0
JOINT SWELLING: 0
NERVOUS/ANXIOUS: 0

## 2023-05-23 ASSESSMENT — ACTIVITIES OF DAILY LIVING (ADL): CURRENT_FUNCTION: NO ASSISTANCE NEEDED

## 2023-05-23 NOTE — PATIENT INSTRUCTIONS
Patient Education   Personalized Prevention Plan  You are due for the preventive services outlined below.  Your care team is available to assist you in scheduling these services.  If you have already completed any of these items, please share that information with your care team to update in your medical record.  Health Maintenance Due   Topic Date Due     Annual Wellness Visit  02/08/2022     COVID-19 Vaccine (5 - Moderna series) 03/12/2023

## 2023-05-23 NOTE — PROGRESS NOTES
"SUBJECTIVE:   Chris is a 77 year old who presents for Preventive Visit.      5/23/2023     9:16 AM   Additional Questions   Roomed by Stacia Humphries   Patient has been advised of split billing requirements and indicates understanding: Yes  Are you in the first 12 months of your Medicare coverage?  No    Healthy Habits:     In general, how would you rate your overall health?  Excellent    Frequency of exercise:  6-7 days/week    Duration of exercise:  15-30 minutes    Do you usually eat at least 4 servings of fruit and vegetables a day, include whole grains    & fiber and avoid regularly eating high fat or \"junk\" foods?  Yes    Taking medications regularly:  Yes    Medication side effects:  None    Ability to successfully perform activities of daily living:  No assistance needed    Home Safety:  Lack of grab bars in the bathroom    Hearing Impairment:  No hearing concerns    In the past 6 months, have you been bothered by leaking of urine?  No    In general, how would you rate your overall mental or emotional health?  Excellent      PHQ-2 Total Score: 0    Additional concerns today:  No        Have you ever done Advance Care Planning? (For example, a Health Directive, POLST, or a discussion with a medical provider or your loved ones about your wishes): Yes, advance care planning is on file.      Fall risk  Fallen 2 or more times in the past year?: No  Any fall with injury in the past year?: No    Cognitive Screening   1) Repeat 3 items (Leader, Season, Table)    2) Clock draw: NORMAL  3) 3 item recall: Recalls 3 objects  Results: 3 items recalled: COGNITIVE IMPAIRMENT LESS LIKELY    Mini-CogTM Copyright YANET Foster. Licensed by the author for use in [facility]; reprinted with permission (tim@.[facility]). All rights reserved.          Reviewed and updated as needed this visit by clinical staff   Tobacco  Allergies  Meds              Reviewed and updated as needed this visit by Provider                 Social " History     Tobacco Use     Smoking status: Never     Smokeless tobacco: Never   Vaping Use     Vaping status: Never Used   Substance Use Topics     Alcohol use: Not Currently     Comment: Social drinking once in a while.             5/18/2023    12:41 PM   Alcohol Use   Prescreen: >3 drinks/day or >7 drinks/week? No     Do you have a current opioid prescription? No  Do you use any other controlled substances or medications that are not prescribed by a provider? None              Current providers sharing in care for this patient include:   Patient Care Team:  Won Menjivar MD as PCP - General (Family Medicine)  Chaparro Patterson MD as MD (Urology)  Won Menjivar MD as Assigned PCP  Stephania, Antonina MARROQUIN RN as Personal Advocate & Liaison (PAL)  Chaparro Patterson MD as Assigned Surgical Provider    The following health maintenance items are reviewed in Epic and correct as of today:  Health Maintenance   Topic Date Due     MEDICARE ANNUAL WELLNESS VISIT  02/08/2022     COVID-19 Vaccine (5 - Moderna series) 03/12/2023     COLORECTAL CANCER SCREENING  07/24/2023     PSA  02/03/2024     BMP  05/09/2024     ANNUAL REVIEW OF HM ORDERS  05/23/2024     FALL RISK ASSESSMENT  05/23/2024     ADVANCE CARE PLANNING  05/23/2028     DTAP/TDAP/TD IMMUNIZATION (3 - Td or Tdap) 10/11/2032     HEPATITIS C SCREENING  Completed     PHQ-2 (once per calendar year)  Completed     INFLUENZA VACCINE  Completed     Pneumococcal Vaccine: 65+ Years  Completed     ZOSTER IMMUNIZATION  Completed     IPV IMMUNIZATION  Aged Out     MENINGITIS IMMUNIZATION  Aged Out     LIPID  Discontinued               Review of Systems   Constitutional: Negative for chills and fever.   HENT: Negative for congestion, ear pain, hearing loss and sore throat.    Eyes: Negative for pain and visual disturbance.   Respiratory: Negative for cough and shortness of breath.    Cardiovascular: Negative for chest pain, palpitations and peripheral edema.  "  Gastrointestinal: Positive for constipation. Negative for abdominal pain, diarrhea, heartburn, hematochezia and nausea.   Genitourinary: Positive for frequency and urgency. Negative for dysuria, genital sores, hematuria, impotence and penile discharge.   Musculoskeletal: Negative for arthralgias, joint swelling and myalgias.   Skin: Negative for rash.   Neurological: Negative for dizziness, weakness, headaches and paresthesias.   Psychiatric/Behavioral: Negative for mood changes. The patient is not nervous/anxious.          OBJECTIVE:   /72 (BP Location: Right arm, Patient Position: Sitting, Cuff Size: Adult Regular)   Pulse 68   Temp 97.6  F (36.4  C) (Oral)   Resp 14   Ht 1.702 m (5' 7\")   Wt 58.1 kg (128 lb 1.6 oz)   SpO2 100%   BMI 20.06 kg/m   Estimated body mass index is 20.06 kg/m  as calculated from the following:    Height as of this encounter: 1.702 m (5' 7\").    Weight as of this encounter: 58.1 kg (128 lb 1.6 oz).  Physical Exam  Constitutional:       Appearance: Normal appearance.   HENT:      Head: Atraumatic.      Right Ear: Tympanic membrane normal. There is impacted cerumen.      Left Ear: Tympanic membrane normal. There is impacted cerumen.      Ears:      Comments: r canal with bleeding post, hemostasis noted     Nose: Nose normal.      Mouth/Throat:      Mouth: Mucous membranes are moist.   Eyes:      Pupils: Pupils are equal, round, and reactive to light.   Cardiovascular:      Rate and Rhythm: Normal rate and regular rhythm.      Heart sounds: Normal heart sounds.   Pulmonary:      Effort: Pulmonary effort is normal.      Breath sounds: Normal breath sounds.   Abdominal:      General: Abdomen is flat. Bowel sounds are normal.   Musculoskeletal:      Cervical back: Neck supple.   Skin:     General: Skin is warm and dry.   Neurological:      General: No focal deficit present.      Mental Status: He is alert.   Psychiatric:         Mood and Affect: Mood normal. "               ASSESSMENT / PLAN:     Problem List Items Addressed This Visit     BENIGN HYPERTENSION     Controlled continue check twice yearly         Benign prostatic hyperplasia with nocturia     Improved. TURP tomorrow         Bilateral impacted cerumen     Lavaged clear         Elevated glucose     Glucose   Date Value Ref Range Status   05/09/2023 102 (H) 70 - 99 mg/dL Final   01/06/2023 111 (H) 70 - 99 mg/dL Final   02/05/2020 100 (H) 70 - 99 mg/dL Final     Comment:     Fasting specimen   01/21/2019 97 70 - 99 mg/dL Final     Comment:     Fasting specimen   01/18/2018 94 70 - 99 mg/dL Final     Comment:     Fasting specimen   01/17/2017 92 70 - 99 mg/dL Final     Comment:     Fasting specimen   01/14/2016 93 70 - 99 mg/dL Final   broaden data base             Relevant Orders    Hemoglobin A1c (Completed)    Other constipation     Decades, unchanged. Colonoscopy scheduled         Wellness examination - Primary    Relevant Orders    TSH with free T4 reflex    PRIMARY CARE FOLLOW-UP SCHEDULING       Patient has been advised of split billing requirements and indicates understanding: Yes      COUNSELING:  Reviewed preventive health counseling, as reflected in patient instructions        He reports that he has never smoked. He has never used smokeless tobacco.      Appropriate preventive services were discussed with this patient, including applicable screening as appropriate for cardiovascular disease, diabetes, osteopenia/osteoporosis, and glaucoma.  As appropriate for age/gender, discussed screening for colorectal cancer, prostate cancer, breast cancer, and cervical cancer. Checklist reviewing preventive services available has been given to the patient.    Reviewed patients plan of care and provided an AVS. The Basic Care Plan (routine screening as documented in Health Maintenance) for Paul meets the Care Plan requirement. This Care Plan has been established and reviewed with the Patient.      Won Menjivar,  MD  [facility]    Identified Health Risks:    I have reviewed Opioid Use Disorder and Substance Use Disorder risk factors and made any needed referrals.   Won Menjivar MD

## 2023-05-23 NOTE — ASSESSMENT & PLAN NOTE
Glucose   Date Value Ref Range Status   05/09/2023 102 (H) 70 - 99 mg/dL Final   01/06/2023 111 (H) 70 - 99 mg/dL Final   02/05/2020 100 (H) 70 - 99 mg/dL Final     Comment:     Fasting specimen   01/21/2019 97 70 - 99 mg/dL Final     Comment:     Fasting specimen   01/18/2018 94 70 - 99 mg/dL Final     Comment:     Fasting specimen   01/17/2017 92 70 - 99 mg/dL Final     Comment:     Fasting specimen   01/14/2016 93 70 - 99 mg/dL Final   [facility] data base

## 2023-05-23 NOTE — NURSING NOTE
Patient identified using two patient identifiers.  Ear exam showing wax occlusion completed by provider.  Solution: warm water was placed in the bilateral ear(s) via irrigation tool: elephant ear.    Mynor Sawant CMA on 5/23/2023 at 10:15 AM

## 2023-05-24 ENCOUNTER — OFFICE VISIT (OUTPATIENT)
Dept: UROLOGY | Facility: CLINIC | Age: 78
End: 2023-05-24
Payer: MEDICARE

## 2023-05-24 VITALS
BODY MASS INDEX: 20.09 KG/M2 | WEIGHT: 128 LBS | SYSTOLIC BLOOD PRESSURE: 126 MMHG | DIASTOLIC BLOOD PRESSURE: 82 MMHG | HEIGHT: 67 IN

## 2023-05-24 DIAGNOSIS — R39.16 BENIGN PROSTATIC HYPERPLASIA (BPH) WITH STRAINING ON URINATION: Primary | ICD-10-CM

## 2023-05-24 DIAGNOSIS — R33.9 INCOMPLETE BLADDER EMPTYING: ICD-10-CM

## 2023-05-24 DIAGNOSIS — N40.1 BENIGN PROSTATIC HYPERPLASIA (BPH) WITH STRAINING ON URINATION: Primary | ICD-10-CM

## 2023-05-24 LAB
ALBUMIN UR-MCNC: NEGATIVE MG/DL
APPEARANCE UR: CLEAR
BILIRUB UR QL STRIP: NEGATIVE
COLOR UR AUTO: YELLOW
GLUCOSE UR STRIP-MCNC: NEGATIVE MG/DL
HGB UR QL STRIP: ABNORMAL
KETONES UR STRIP-MCNC: NEGATIVE MG/DL
LEUKOCYTE ESTERASE UR QL STRIP: ABNORMAL
NITRATE UR QL: NEGATIVE
PH UR STRIP: 7 [PH] (ref 5–7)
RESIDUAL VOLUME (RV) (EXTERNAL): 130
SP GR UR STRIP: 1.01 (ref 1–1.03)
UROBILINOGEN UR STRIP-ACNC: 0.2 E.U./DL

## 2023-05-24 PROCEDURE — 87186 SC STD MICRODIL/AGAR DIL: CPT | Performed by: UROLOGY

## 2023-05-24 PROCEDURE — 51798 US URINE CAPACITY MEASURE: CPT | Performed by: UROLOGY

## 2023-05-24 PROCEDURE — 81003 URINALYSIS AUTO W/O SCOPE: CPT | Mod: QW | Performed by: UROLOGY

## 2023-05-24 PROCEDURE — 87088 URINE BACTERIA CULTURE: CPT | Performed by: UROLOGY

## 2023-05-24 PROCEDURE — 99214 OFFICE O/P EST MOD 30 MIN: CPT | Mod: 25 | Performed by: UROLOGY

## 2023-05-24 PROCEDURE — 87086 URINE CULTURE/COLONY COUNT: CPT | Performed by: UROLOGY

## 2023-05-24 ASSESSMENT — PAIN SCALES - GENERAL: PAINLEVEL: NO PAIN (0)

## 2023-05-24 NOTE — LETTER
5/24/2023       RE: Paul Smith  [address] [address]     Dear Colleague,    Thank you for referring your patient, Paul Smith, to the [facility] [place] at [facility]. Please see a copy of my visit note below.    Office Visit Note  [facility]  [phone]    UROLOGIC DIAGNOSES:   Enlarged prostate  Urinary retention  Elevated PSA    CURRENT INTERVENTIONS:   Negative prostate biopsy x2  MRI 2015  Self catheterization previously    HISTORY:   Chris returns to clinic today for follow-up on enlarged prostate along with urinalysis and check of his postvoid residual.  He continues to not need to catheterize.  His postvoid residual today is 130 mL.  He continues to have nocturia 2 times nightly and a slow urinary stream during the day.      PAST MEDICAL HISTORY:   Past Medical History:   Diagnosis Date    Anxiety 3/23/2022    Benign prostatic hyperplasia with nocturia 3/23/2022    Enlarged prostate     per pt reprt    Hypertension     Other constipation 5/23/2023    Prostate cancer (H) 3/23/2022    PSA elevation        PAST SURGICAL HISTORY:   Past Surgical History:   Procedure Laterality Date    BIOPSY  06/12/12    Prostate - High PSA - Benign    COLONOSCOPY  7/24/2013    Dr. Craig [facility]    COLONOSCOPY  7/24/2013    Procedure: COMBINED COLONOSCOPY, SINGLE BIOPSY/POLYPECTOMY BY BIOPSY;  Colonoscopy with polypectomy using cold forceps.;  Surgeon: Zach Craig MD;  Location:  GI       FAMILY HISTORY:   Family History   Problem Relation Age of Onset    Hypertension Mother         Dementia    Alzheimer Disease Mother         dementia    Cancer Father 64        lymphoma cancer    Heart Disease Father         triple by-pass    Other Cancer Father         Lymphoma       SOCIAL HISTORY:   Social History     Socioeconomic History    Marital status: Single     Spouse name: None    Number of children: None    Years of  education: None    Highest education level: Some college, no degree   Tobacco Use    Smoking status: Never    Smokeless tobacco: Never   Vaping Use    Vaping status: Never Used   Substance and Sexual Activity    Alcohol use: Not Currently     Comment: Social drinking once in a while.    Drug use: No    Sexual activity: Never   Other Topics Concern    Parent/sibling w/ CABG, MI or angioplasty before 65F 55M? No     Social Determinants of Health     Financial Resource Strain: Low Risk  (5/6/2023)    Overall Financial Resource Strain (CARDIA)     Difficulty of Paying Living Expenses: Not hard at all   Food Insecurity: No Food Insecurity (5/6/2023)    Hunger Vital Sign     Worried About Running Out of Food in the Last Year: Never true     Ran Out of Food in the Last Year: Never true   Transportation Needs: No Transportation Needs (5/6/2023)    PRAPARE - Transportation     Lack of Transportation (Medical): No     Lack of Transportation (Non-Medical): No   Physical Activity: Sufficiently Active (5/6/2023)    Exercise Vital Sign     Days of Exercise per Week: 7 days     Minutes of Exercise per Session: 30 min   Stress: No Stress Concern Present (5/6/2023)    [employer] [place] [employer] - Occupational Stress Questionnaire     Feeling of Stress : Not at all   Social Connections: Socially Isolated (5/6/2023)    Social Connection and Isolation Panel [NHANES]     Frequency of Communication with Friends and Family: Once a week     Frequency of Social Gatherings with Friends and Family: Once a week     Attends Anabaptism Services: 1 to 4 times per year     Active Member of Clubs or Organizations: No     Marital Status: Never    Housing Stability: Low Risk  (5/6/2023)    Housing Stability Vital Sign     Unable to Pay for Housing in the Last Year: No     Number of Places Lived in the Last Year: 1     Unstable Housing in the Last Year: No       Review Of Systems:  Skin: No rash, pruritis, or skin  "pigmentation  Eyes: No changes in vision  Ears/Nose/Throat: No changes in hearing, no nosebleeds  Respiratory: No shortness of breath, dyspnea on exertion, cough, or hemoptysis  Cardiovascular: No chest pain or palpitations  Gastrointestinal: No diarrhea or constipation. No abdominal pain. No hematochezia  Genitourinary: see HPI  Musculoskeletal: No pain or swelling of joints, normal range of motion  Neurologic: No weakness or tremors  Psychiatric: No recent changes in memory or mood  Hematologic/Lymphatic/Immunologic: No easy bruising or enlarged lymph nodes  Endocrine: No weight gain or loss      PHYSICAL EXAM:    /82   Ht 1.702 m (5' 7\")   Wt 58.1 kg (128 lb)   BMI 20.05 kg/m      Constitutional: Well developed. Conversant and in no acute distress  Eyes: Anicteric sclera, conjunctiva clear, normal extraocular movements  ENT: Normocephalic and atraumatic,   Skin: Warm and dry. No rashes or lesions  Cardiac: No peripheral edema  Back/Flank: Not done  CNS/PNS: Normal musculature and movements, moves all extremities normally  Respiratory: Normal non-labored breathing  Abdomen: Soft nontender and nondistended  Peripheral Vascular: No peripheral edema  Mental Status/Psych: Alert and Oriented x 3. Normal mood and affect    Penis: Not done  Scrotal Skin: Not done  Testicles: Not done  Epididymis: Not done  Digital Rectal Exam:     Cystoscopy: Not done    Imaging: None    Urinalysis: UA RESULTS:  Recent Labs   Lab Test 05/24/23  0917 02/06/23  0800 01/06/23  1439   COLOR Yellow   < > Light Yellow   APPEARANCE Clear   < > Slightly Cloudy*   URINEGLC Negative   < > Negative   URINEBILI Negative   < > Negative   URINEKETONE Negative   < > Negative   SG 1.015   < > 1.009   UBLD Trace*   < > Moderate*   URINEPH 7.0   < > 8.0*   PROTEIN Negative   < > 10*   UROBILINOGEN 0.2   < >  --    NITRITE Negative   < > Negative   LEUKEST Small*   < > Negative   RBCU  --   --  37*   WBCU  --   --  6*    < > = values in this " interval not displayed.       PSA: 8.49    Post Void Residual: 130mL    Other labs: None today      IMPRESSION:  Enlarged prostate with incomplete bladder emptying    PLAN:  We again discussed options of proceeding with surgery versus observation.  He wishes to proceed with transurethral resection of the prostate.  We discussed surgery again today and answered all remaining questions for him.  I will see him back next week for his TURP.        Chaparro Patterson M.D.

## 2023-05-24 NOTE — NURSING NOTE
Chief Complaint   Patient presents with     Benign Prostatic Hypertrophy     Pt has not had to self cath since 02/19/23.    PVR: 130 mL by bladder scan    Julia Hartman, EMT

## 2023-05-24 NOTE — PROGRESS NOTES
Office Visit Note  [facility]  [phone]    UROLOGIC DIAGNOSES:   Enlarged prostate  Urinary retention  Elevated PSA    CURRENT INTERVENTIONS:   Negative prostate biopsy x2  MRI 2015  Self catheterization previously    HISTORY:   Chris returns to clinic today for follow-up on enlarged prostate along with urinalysis and check of his postvoid residual.  He continues to not need to catheterize.  His postvoid residual today is 130 mL.  He continues to have nocturia 2 times nightly and a slow urinary stream during the day.      PAST MEDICAL HISTORY:   Past Medical History:   Diagnosis Date     Anxiety 3/23/2022     Benign prostatic hyperplasia with nocturia 3/23/2022     Enlarged prostate     per pt reprt     Hypertension      Other constipation 5/23/2023     Prostate cancer (H) 3/23/2022     PSA elevation        PAST SURGICAL HISTORY:   Past Surgical History:   Procedure Laterality Date     BIOPSY  06/12/12    Prostate - High PSA - Benign     COLONOSCOPY  7/24/2013    Dr. Craig [facility]     COLONOSCOPY  7/24/2013    Procedure: COMBINED COLONOSCOPY, SINGLE BIOPSY/POLYPECTOMY BY BIOPSY;  Colonoscopy with polypectomy using cold forceps.;  Surgeon: Zach Craig MD;  Location:  GI       FAMILY HISTORY:   Family History   Problem Relation Age of Onset     Hypertension Mother         Dementia     Alzheimer Disease Mother         dementia     Cancer Father 64        lymphoma cancer     Heart Disease Father         triple by-pass     Other Cancer Father         Lymphoma       SOCIAL HISTORY:   Social History     Socioeconomic History     Marital status: Single     Spouse name: None     Number of children: None     Years of education: None     Highest education level: Some college, no degree   Tobacco Use     Smoking status: Never     Smokeless tobacco: Never   Vaping Use     Vaping status: Never Used   Substance and Sexual Activity     Alcohol use: Not Currently     Comment: Social drinking once in a while.      Drug use: No     Sexual activity: Never   Other Topics Concern     Parent/sibling w/ CABG, MI or angioplasty before 65F 55M? No     Social Determinants of Health     Financial Resource Strain: Low Risk  (5/6/2023)    Overall Financial Resource Strain (CARDIA)      Difficulty of Paying Living Expenses: Not hard at all   Food Insecurity: No Food Insecurity (5/6/2023)    Hunger Vital Sign      Worried About Running Out of Food in the Last Year: Never true      Ran Out of Food in the Last Year: Never true   Transportation Needs: No Transportation Needs (5/6/2023)    PRAPARE - Transportation      Lack of Transportation (Medical): No      Lack of Transportation (Non-Medical): No   Physical Activity: Sufficiently Active (5/6/2023)    Exercise Vital Sign      Days of Exercise per Week: 7 days      Minutes of Exercise per Session: 30 min   Stress: No Stress Concern Present (5/6/2023)    [place] [employer] - Occupational Stress Questionnaire      Feeling of Stress : Not at all   Social Connections: Socially Isolated (5/6/2023)    Social Connection and Isolation Panel [NHANES]      Frequency of Communication with Friends and Family: Once a week      Frequency of Social Gatherings with Friends and Family: Once a week      Attends Orthodoxy Services: 1 to 4 times per year      Active Member of Clubs or Organizations: No      Marital Status: Never    Housing Stability: Low Risk  (5/6/2023)    Housing Stability Vital Sign      Unable to Pay for Housing in the Last Year: No      Number of Places Lived in the Last Year: 1      Unstable Housing in the Last Year: No       Review Of Systems:  Skin: No rash, pruritis, or skin pigmentation  Eyes: No changes in vision  Ears/Nose/Throat: No changes in hearing, no nosebleeds  Respiratory: No shortness of breath, dyspnea on exertion, cough, or hemoptysis  Cardiovascular: No chest pain or palpitations  Gastrointestinal: No diarrhea or constipation. No abdominal  "pain. No hematochezia  Genitourinary: see HPI  Musculoskeletal: No pain or swelling of joints, normal range of motion  Neurologic: No weakness or tremors  Psychiatric: No recent changes in memory or mood  Hematologic/Lymphatic/Immunologic: No easy bruising or enlarged lymph nodes  Endocrine: No weight gain or loss      PHYSICAL EXAM:    /82   Ht 1.702 m (5' 7\")   Wt 58.1 kg (128 lb)   BMI 20.05 kg/m      Constitutional: Well developed. Conversant and in no acute distress  Eyes: Anicteric sclera, conjunctiva clear, normal extraocular movements  ENT: Normocephalic and atraumatic,   Skin: Warm and dry. No rashes or lesions  Cardiac: No peripheral edema  Back/Flank: Not done  CNS/PNS: Normal musculature and movements, moves all extremities normally  Respiratory: Normal non-labored breathing  Abdomen: Soft nontender and nondistended  Peripheral Vascular: No peripheral edema  Mental Status/Psych: Alert and Oriented x 3. Normal mood and affect    Penis: Not done  Scrotal Skin: Not done  Testicles: Not done  Epididymis: Not done  Digital Rectal Exam:     Cystoscopy: Not done    Imaging: None    Urinalysis: UA RESULTS:  Recent Labs   Lab Test 05/24/23  0917 02/06/23  0800 01/06/23  1439   COLOR Yellow   < > Light Yellow   APPEARANCE Clear   < > Slightly Cloudy*   URINEGLC Negative   < > Negative   URINEBILI Negative   < > Negative   URINEKETONE Negative   < > Negative   SG 1.015   < > 1.009   UBLD Trace*   < > Moderate*   URINEPH 7.0   < > 8.0*   PROTEIN Negative   < > 10*   UROBILINOGEN 0.2   < >  --    NITRITE Negative   < > Negative   LEUKEST Small*   < > Negative   RBCU  --   --  37*   WBCU  --   --  6*    < > = values in this interval not displayed.       PSA: 8.49    Post Void Residual: 130mL    Other labs: None today      IMPRESSION:  Enlarged prostate with incomplete bladder emptying    PLAN:  We again discussed options of proceeding with surgery versus observation.  He wishes to proceed with transurethral " resection of the prostate.  We discussed surgery again today and answered all remaining questions for him.  I will see him back next week for his TURP.        Chaparro Patterson M.D.

## 2023-05-26 NOTE — PHARMACY-ADMISSION MEDICATION HISTORY
Medication reconciliation interview completed by pre-admitting nurse, reviewed by pharmacy. No further clarifications needed.     Prior to Admission medications    Medication Sig Last Dose Taking? Auth Provider Long Term End Date   amLODIPine (NORVASC) 5 MG tablet Take 1 tablet (5 mg) by mouth daily  Yes Won Menjivar MD Yes    ASPIRIN ADULT LOW STRENGTH PO Take 81 mg by mouth daily.  Yes Reported, Patient     lisinopril (ZESTRIL) 20 MG tablet Take 1 tablet (20 mg) by mouth daily  Yes Won Menjivar MD Yes

## 2023-05-27 LAB — BACTERIA UR CULT: ABNORMAL

## 2023-05-30 ENCOUNTER — TELEPHONE (OUTPATIENT)
Dept: UROLOGY | Facility: CLINIC | Age: 78
End: 2023-05-30
Payer: MEDICARE

## 2023-05-30 DIAGNOSIS — N39.0 UTI (URINARY TRACT INFECTION): Primary | ICD-10-CM

## 2023-05-30 RX ORDER — CIPROFLOXACIN 500 MG/1
500 TABLET, FILM COATED ORAL 2 TIMES DAILY
Qty: 14 TABLET | Refills: 0 | Status: SHIPPED | OUTPATIENT
Start: 2023-05-30 | End: 2023-06-06

## 2023-06-02 ENCOUNTER — ANESTHESIA (OUTPATIENT)
Dept: SURGERY | Facility: CLINIC | Age: 78
End: 2023-06-02
Payer: MEDICARE

## 2023-06-02 ENCOUNTER — HOSPITAL ENCOUNTER (OUTPATIENT)
Facility: CLINIC | Age: 78
Discharge: HOME | End: 2023-06-04
Attending: UROLOGY | Admitting: UROLOGY
Payer: MEDICARE

## 2023-06-02 ENCOUNTER — TELEPHONE (OUTPATIENT)
Dept: FAMILY MEDICINE | Facility: CLINIC | Age: 78
End: 2023-06-02
Payer: MEDICARE

## 2023-06-02 ENCOUNTER — ANESTHESIA EVENT (OUTPATIENT)
Dept: SURGERY | Facility: CLINIC | Age: 78
End: 2023-06-02
Payer: MEDICARE

## 2023-06-02 DIAGNOSIS — N40.0 ENLARGED PROSTATE: Primary | ICD-10-CM

## 2023-06-02 PROCEDURE — 258N000003 HC RX IP 258 OP 636: Performed by: UROLOGY

## 2023-06-02 PROCEDURE — 88305 TISSUE EXAM BY PATHOLOGIST: CPT | Mod: TC | Performed by: UROLOGY

## 2023-06-02 PROCEDURE — 710N000009 HC RECOVERY PHASE 1, LEVEL 1, PER MIN: Performed by: UROLOGY

## 2023-06-02 PROCEDURE — 250N000011 HC RX IP 250 OP 636: Performed by: UROLOGY

## 2023-06-02 PROCEDURE — 258N000001 HC RX 258: Performed by: UROLOGY

## 2023-06-02 PROCEDURE — 258N000003 HC RX IP 258 OP 636: Performed by: NURSE ANESTHETIST, CERTIFIED REGISTERED

## 2023-06-02 PROCEDURE — 250N000011 HC RX IP 250 OP 636: Performed by: NURSE ANESTHETIST, CERTIFIED REGISTERED

## 2023-06-02 PROCEDURE — 250N000009 HC RX 250: Performed by: NURSE ANESTHETIST, CERTIFIED REGISTERED

## 2023-06-02 PROCEDURE — 272N000001 HC OR GENERAL SUPPLY STERILE: Performed by: UROLOGY

## 2023-06-02 PROCEDURE — 370N000017 HC ANESTHESIA TECHNICAL FEE, PER MIN: Performed by: UROLOGY

## 2023-06-02 PROCEDURE — 360N000076 HC SURGERY LEVEL 3, PER MIN: Performed by: UROLOGY

## 2023-06-02 PROCEDURE — 250N000025 HC SEVOFLURANE, PER MIN: Performed by: UROLOGY

## 2023-06-02 PROCEDURE — 999N000141 HC STATISTIC PRE-PROCEDURE NURSING ASSESSMENT: Performed by: UROLOGY

## 2023-06-02 PROCEDURE — 52601 PROSTATECTOMY (TURP): CPT | Performed by: UROLOGY

## 2023-06-02 RX ORDER — ONDANSETRON 4 MG/1
4 TABLET, ORALLY DISINTEGRATING ORAL EVERY 30 MIN PRN
Status: DISCONTINUED | OUTPATIENT
Start: 2023-06-02 | End: 2023-06-02 | Stop reason: HOSPADM

## 2023-06-02 RX ORDER — OXYCODONE HYDROCHLORIDE 5 MG/1
5 TABLET ORAL EVERY 4 HOURS PRN
Status: DISCONTINUED | OUTPATIENT
Start: 2023-06-02 | End: 2023-06-04 | Stop reason: HOSPADM

## 2023-06-02 RX ORDER — HYDROMORPHONE HCL IN WATER/PF 6 MG/30 ML
0.2 PATIENT CONTROLLED ANALGESIA SYRINGE INTRAVENOUS EVERY 5 MIN PRN
Status: DISCONTINUED | OUTPATIENT
Start: 2023-06-02 | End: 2023-06-02 | Stop reason: HOSPADM

## 2023-06-02 RX ORDER — GLYCOPYRROLATE 0.2 MG/ML
INJECTION, SOLUTION INTRAMUSCULAR; INTRAVENOUS PRN
Status: DISCONTINUED | OUTPATIENT
Start: 2023-06-02 | End: 2023-06-02

## 2023-06-02 RX ORDER — LIDOCAINE 40 MG/G
CREAM TOPICAL
Status: DISCONTINUED | OUTPATIENT
Start: 2023-06-02 | End: 2023-06-04 | Stop reason: HOSPADM

## 2023-06-02 RX ORDER — SODIUM CHLORIDE, SODIUM LACTATE, POTASSIUM CHLORIDE, CALCIUM CHLORIDE 600; 310; 30; 20 MG/100ML; MG/100ML; MG/100ML; MG/100ML
INJECTION, SOLUTION INTRAVENOUS CONTINUOUS
Status: DISCONTINUED | OUTPATIENT
Start: 2023-06-02 | End: 2023-06-02 | Stop reason: HOSPADM

## 2023-06-02 RX ORDER — NALOXONE HYDROCHLORIDE 0.4 MG/ML
0.2 INJECTION, SOLUTION INTRAMUSCULAR; INTRAVENOUS; SUBCUTANEOUS
Status: DISCONTINUED | OUTPATIENT
Start: 2023-06-02 | End: 2023-06-04 | Stop reason: HOSPADM

## 2023-06-02 RX ORDER — DEXAMETHASONE SODIUM PHOSPHATE 4 MG/ML
INJECTION, SOLUTION INTRA-ARTICULAR; INTRALESIONAL; INTRAMUSCULAR; INTRAVENOUS; SOFT TISSUE PRN
Status: DISCONTINUED | OUTPATIENT
Start: 2023-06-02 | End: 2023-06-02

## 2023-06-02 RX ORDER — ONDANSETRON 4 MG/1
4-8 TABLET, ORALLY DISINTEGRATING ORAL EVERY 8 HOURS PRN
Qty: 10 TABLET | Refills: 0 | Status: SHIPPED | OUTPATIENT
Start: 2023-06-02

## 2023-06-02 RX ORDER — CEFAZOLIN SODIUM/WATER 2 G/20 ML
2 SYRINGE (ML) INTRAVENOUS SEE ADMIN INSTRUCTIONS
Status: DISCONTINUED | OUTPATIENT
Start: 2023-06-02 | End: 2023-06-03

## 2023-06-02 RX ORDER — CEFAZOLIN SODIUM/WATER 2 G/20 ML
2 SYRINGE (ML) INTRAVENOUS
Status: COMPLETED | OUTPATIENT
Start: 2023-06-02 | End: 2023-06-02

## 2023-06-02 RX ORDER — NALOXONE HYDROCHLORIDE 0.4 MG/ML
0.4 INJECTION, SOLUTION INTRAMUSCULAR; INTRAVENOUS; SUBCUTANEOUS
Status: DISCONTINUED | OUTPATIENT
Start: 2023-06-02 | End: 2023-06-04 | Stop reason: HOSPADM

## 2023-06-02 RX ORDER — ONDANSETRON 4 MG/1
4 TABLET, ORALLY DISINTEGRATING ORAL EVERY 6 HOURS PRN
Status: DISCONTINUED | OUTPATIENT
Start: 2023-06-02 | End: 2023-06-04 | Stop reason: HOSPADM

## 2023-06-02 RX ORDER — LISINOPRIL 20 MG/1
20 TABLET ORAL DAILY
Status: DISCONTINUED | OUTPATIENT
Start: 2023-06-03 | End: 2023-06-04 | Stop reason: HOSPADM

## 2023-06-02 RX ORDER — ONDANSETRON 2 MG/ML
4 INJECTION INTRAMUSCULAR; INTRAVENOUS EVERY 6 HOURS PRN
Status: DISCONTINUED | OUTPATIENT
Start: 2023-06-02 | End: 2023-06-04 | Stop reason: HOSPADM

## 2023-06-02 RX ORDER — SODIUM CHLORIDE, SODIUM LACTATE, POTASSIUM CHLORIDE, CALCIUM CHLORIDE 600; 310; 30; 20 MG/100ML; MG/100ML; MG/100ML; MG/100ML
INJECTION, SOLUTION INTRAVENOUS CONTINUOUS PRN
Status: DISCONTINUED | OUTPATIENT
Start: 2023-06-02 | End: 2023-06-02

## 2023-06-02 RX ORDER — PROCHLORPERAZINE MALEATE 5 MG
5 TABLET ORAL EVERY 6 HOURS PRN
Status: DISCONTINUED | OUTPATIENT
Start: 2023-06-02 | End: 2023-06-04 | Stop reason: HOSPADM

## 2023-06-02 RX ORDER — ACETAMINOPHEN 325 MG/1
650 TABLET ORAL EVERY 6 HOURS PRN
Status: DISCONTINUED | OUTPATIENT
Start: 2023-06-02 | End: 2023-06-04 | Stop reason: HOSPADM

## 2023-06-02 RX ORDER — OXYCODONE HYDROCHLORIDE 5 MG/1
10 TABLET ORAL EVERY 4 HOURS PRN
Status: DISCONTINUED | OUTPATIENT
Start: 2023-06-02 | End: 2023-06-04 | Stop reason: HOSPADM

## 2023-06-02 RX ORDER — EPHEDRINE SULFATE 50 MG/ML
INJECTION, SOLUTION INTRAMUSCULAR; INTRAVENOUS; SUBCUTANEOUS PRN
Status: DISCONTINUED | OUTPATIENT
Start: 2023-06-02 | End: 2023-06-02

## 2023-06-02 RX ORDER — FENTANYL CITRATE 50 UG/ML
25 INJECTION, SOLUTION INTRAMUSCULAR; INTRAVENOUS EVERY 5 MIN PRN
Status: DISCONTINUED | OUTPATIENT
Start: 2023-06-02 | End: 2023-06-02 | Stop reason: HOSPADM

## 2023-06-02 RX ORDER — AMLODIPINE BESYLATE 5 MG/1
5 TABLET ORAL DAILY
Status: DISCONTINUED | OUTPATIENT
Start: 2023-06-03 | End: 2023-06-04 | Stop reason: HOSPADM

## 2023-06-02 RX ORDER — ONDANSETRON 2 MG/ML
INJECTION INTRAMUSCULAR; INTRAVENOUS PRN
Status: DISCONTINUED | OUTPATIENT
Start: 2023-06-02 | End: 2023-06-02

## 2023-06-02 RX ORDER — DEXTROSE MONOHYDRATE, SODIUM CHLORIDE, AND POTASSIUM CHLORIDE 50; 1.49; 4.5 G/1000ML; G/1000ML; G/1000ML
INJECTION, SOLUTION INTRAVENOUS CONTINUOUS
Status: DISCONTINUED | OUTPATIENT
Start: 2023-06-02 | End: 2023-06-03

## 2023-06-02 RX ORDER — HYDROXYZINE HYDROCHLORIDE 10 MG/1
10 TABLET, FILM COATED ORAL EVERY 6 HOURS PRN
Qty: 30 TABLET | Refills: 0 | Status: SHIPPED | OUTPATIENT
Start: 2023-06-02

## 2023-06-02 RX ORDER — ACETAMINOPHEN 325 MG/1
650 TABLET ORAL EVERY 4 HOURS PRN
Qty: 100 TABLET | Refills: 0 | Status: SHIPPED | OUTPATIENT
Start: 2023-06-02

## 2023-06-02 RX ORDER — FENTANYL CITRATE 50 UG/ML
50 INJECTION, SOLUTION INTRAMUSCULAR; INTRAVENOUS EVERY 5 MIN PRN
Status: DISCONTINUED | OUTPATIENT
Start: 2023-06-02 | End: 2023-06-02 | Stop reason: HOSPADM

## 2023-06-02 RX ORDER — FENTANYL CITRATE 50 UG/ML
INJECTION, SOLUTION INTRAMUSCULAR; INTRAVENOUS PRN
Status: DISCONTINUED | OUTPATIENT
Start: 2023-06-02 | End: 2023-06-02

## 2023-06-02 RX ORDER — HYDROMORPHONE HCL IN WATER/PF 6 MG/30 ML
0.4 PATIENT CONTROLLED ANALGESIA SYRINGE INTRAVENOUS EVERY 5 MIN PRN
Status: DISCONTINUED | OUTPATIENT
Start: 2023-06-02 | End: 2023-06-02 | Stop reason: HOSPADM

## 2023-06-02 RX ORDER — PROPOFOL 10 MG/ML
INJECTION, EMULSION INTRAVENOUS PRN
Status: DISCONTINUED | OUTPATIENT
Start: 2023-06-02 | End: 2023-06-02

## 2023-06-02 RX ORDER — ONDANSETRON 2 MG/ML
4 INJECTION INTRAMUSCULAR; INTRAVENOUS EVERY 30 MIN PRN
Status: DISCONTINUED | OUTPATIENT
Start: 2023-06-02 | End: 2023-06-02 | Stop reason: HOSPADM

## 2023-06-02 RX ORDER — SODIUM CHLORIDE, SODIUM LACTATE, POTASSIUM CHLORIDE, CALCIUM CHLORIDE 600; 310; 30; 20 MG/100ML; MG/100ML; MG/100ML; MG/100ML
INJECTION, SOLUTION INTRAVENOUS CONTINUOUS
Status: DISCONTINUED | OUTPATIENT
Start: 2023-06-02 | End: 2023-06-03

## 2023-06-02 RX ORDER — LIDOCAINE HYDROCHLORIDE 20 MG/ML
INJECTION, SOLUTION INFILTRATION; PERINEURAL PRN
Status: DISCONTINUED | OUTPATIENT
Start: 2023-06-02 | End: 2023-06-02

## 2023-06-02 RX ADMIN — GLYCOPYRROLATE 0.2 MG: 0.2 INJECTION, SOLUTION INTRAMUSCULAR; INTRAVENOUS at 13:57

## 2023-06-02 RX ADMIN — POTASSIUM CHLORIDE, DEXTROSE MONOHYDRATE AND SODIUM CHLORIDE: 150; 5; 450 INJECTION, SOLUTION INTRAVENOUS at 18:17

## 2023-06-02 RX ADMIN — Medication 10 MG: at 15:00

## 2023-06-02 RX ADMIN — DEXAMETHASONE SODIUM PHOSPHATE 4 MG: 4 INJECTION, SOLUTION INTRA-ARTICULAR; INTRALESIONAL; INTRAMUSCULAR; INTRAVENOUS; SOFT TISSUE at 13:57

## 2023-06-02 RX ADMIN — SODIUM CHLORIDE, POTASSIUM CHLORIDE, SODIUM LACTATE AND CALCIUM CHLORIDE: 600; 310; 30; 20 INJECTION, SOLUTION INTRAVENOUS at 12:41

## 2023-06-02 RX ADMIN — FENTANYL CITRATE 100 MCG: 50 INJECTION, SOLUTION INTRAMUSCULAR; INTRAVENOUS at 13:57

## 2023-06-02 RX ADMIN — SODIUM CHLORIDE 3000 ML: 900 IRRIGANT IRRIGATION at 18:45

## 2023-06-02 RX ADMIN — Medication 5 MG: at 14:47

## 2023-06-02 RX ADMIN — Medication 2 G: at 13:50

## 2023-06-02 RX ADMIN — PROPOFOL 150 MG: 10 INJECTION, EMULSION INTRAVENOUS at 13:57

## 2023-06-02 RX ADMIN — ONDANSETRON 4 MG: 2 INJECTION INTRAMUSCULAR; INTRAVENOUS at 14:03

## 2023-06-02 RX ADMIN — LIDOCAINE HYDROCHLORIDE 30 MG: 20 INJECTION, SOLUTION INFILTRATION; PERINEURAL at 13:57

## 2023-06-02 RX ADMIN — Medication 5 MG: at 14:56

## 2023-06-02 ASSESSMENT — ACTIVITIES OF DAILY LIVING (ADL)
ADLS_ACUITY_SCORE: 23
ADLS_ACUITY_SCORE: 25
ADLS_ACUITY_SCORE: 23
ADLS_ACUITY_SCORE: 23
ADLS_ACUITY_SCORE: 25
ADLS_ACUITY_SCORE: 23

## 2023-06-02 NOTE — ANESTHESIA PROCEDURE NOTES
Airway       Patient location during procedure: OR  Staff -        CRNA: Sd Salgado APRN CRNA       Performed By: CRNAIndications and Patient Condition       Indications for airway management: divya-procedural       Induction type:intravenous       Mask difficulty assessment: 1 - vent by mask    Final Airway Details       Final airway type: supraglottic airway    Supraglottic Airway Details        Type: LMA       Brand: I-Gel       LMA size: 4    Post intubation assessment        Placement verified by: capnometry, equal breath sounds and chest rise        Number of attempts at approach: 1       Secured with: commercial tube valle       Ease of procedure: easy       Dentition: Intact and Unchanged

## 2023-06-02 NOTE — ANESTHESIA POSTPROCEDURE EVALUATION
Patient: Paul Smith    Procedure: Procedure(s):  Cystoscopy with transurethral, resection of prostate       Anesthesia Type:  General    Note:  Disposition: Outpatient   Postop Pain Control: Uneventful            Sign Out: Well controlled pain   PONV: No   Neuro/Psych: Uneventful            Sign Out: Acceptable/Baseline neuro status   Airway/Respiratory: Uneventful            Sign Out: Acceptable/Baseline resp. status   CV/Hemodynamics: Uneventful            Sign Out: Acceptable CV status; No obvious hypovolemia; No obvious fluid overload   Other NRE: NONE   DID A NON-ROUTINE EVENT OCCUR? No           Last vitals:  Vitals Value Taken Time   /77 06/02/23 1555   Temp 96.9  F (36.1  C) 06/02/23 1516   Pulse 53 06/02/23 1604   Resp 10 06/02/23 1604   SpO2 100 % 06/02/23 1604   Vitals shown include unvalidated device data.    Electronically Signed By: King Nielson MD  June 2, 2023  4:05 PM

## 2023-06-02 NOTE — TELEPHONE ENCOUNTER
Patient Quality Outreach    Patient is due for the following:   Eye exam    Next Steps:   No follow up needed at this time.    Type of outreach:    Chart review performed, no outreach needed. and Pt had an eye exam at [facility] on 1/26/23      Questions for provider review:    None           Danielle Badillo, [facility]

## 2023-06-02 NOTE — OP NOTE
SURGEON:  Chaparro Patterson MD     PREOPERATIVE DIAGNOSIS: Enlarged prostate     POSTOPERATIVE DIAGNOSIS: Enlarged prostate     PROCEDURE PERFORMED: Cystoscopy, transurethral resection of the prostate with the bipolar electrode     ANESTHESIA:  General.     COMPLICATIONS:  None     INDICATIONS FOR PROCEDURE: This is a 77-year-old gentleman with an enlarged prostate and bothersome urinary symptoms who presents for transurethral resection of prostate    DETAILS OF PROCEDURE: The risks and benefits of the procedure were explained in detail the patient and informed consent was obtained.  The patient was brought to the operating room and placed supine on the operating table where he underwent general endotracheal anesthetic.  He was then moved down to the dorsal lithotomy position where he was prepped and draped in the standard sterile fashion.    The procedure began by using the visual obturator to introduce the 26 [place] resectoscope sheath into the bladder.  I performed a complete cystoscopy.  Identified each ureteral orifice and identified verumontanum.  The bladder was trabeculated.  The  median lobe of the prostate showed only very minimal enlargement and the patient has mainly lateral lobe enlargement.    I began my resection by resecting out the median lobe tissue of the prostate.  I then resected out the left lateral lobe followed by the right lateral lobe.  At the end of the resection I resected apical tissue as well as anterior obstructing tissue.  Throughout my resection as careful not to resect distal to the level of the verumontanum and I was careful not to resect the ureteral orifices.  At the end of my resection I used the Urovac  to remove all prostate specimen.  I performed another cystoscopy to ensure proper hemostasis in the prostatic fossa and to ensure that all specimen had been removed.  I removed the scope and I drained the bladder with a 24 [place] three-way Raygoza catheter.  I hand  irrigated the catheter on traction until output was clear.  I then hooked up the catheter to continuous irrigation with normal saline and the procedure was concluded.    The patient tolerated the procedure well without complications.  He went to the postanesthetic care unit in good condition.  He will be admitted to the hospital from there.

## 2023-06-02 NOTE — ANESTHESIA CARE TRANSFER NOTE
Patient: Paul Smith    Procedure: Procedure(s):  Cystoscopy with transurethral, resection of prostate       Diagnosis: Enlarged prostate [N40.0]  Diagnosis Additional Information: No value filed.    Anesthesia Type:   General     Note:    Oropharynx: oropharynx clear of all foreign objects  Level of Consciousness: awake  Oxygen Supplementation: face mask  Level of Supplemental Oxygen (L/min / FiO2): 6  Independent Airway: airway patency satisfactory and stable  Dentition: dentition unchanged  Vital Signs Stable: post-procedure vital signs reviewed and stable  Report to RN Given: handoff report given  Patient transferred to: PACU    Handoff Report: Identifed the Patient, Identified the Reponsible Provider, Reviewed the pertinent medical history, Discussed the surgical course, Reviewed Intra-OP anesthesia mangement and issues during anesthesia, Set expectations for post-procedure period and Allowed opportunity for questions and acknowledgement of understanding      Vitals:  Vitals Value Taken Time   BP     Temp 96.9  F (36.1  C) 06/02/23 1516   Pulse 55 06/02/23 1519   Resp 13 06/02/23 1519   SpO2 100 % 06/02/23 1519   Vitals shown include unvalidated device data.    Electronically Signed By: HUMZA Ma CRNA  June 2, 2023  3:20 PM

## 2023-06-02 NOTE — ANESTHESIA PREPROCEDURE EVALUATION
Anesthesia Pre-Procedure Evaluation    Patient: Paul Smith   MRN: [id number] : 1945        Procedure : Procedure(s):  Cystoscopy with transurethral, resection of prostate          Past Medical History:   Diagnosis Date     Anxiety 2022     Benign prostatic hyperplasia with nocturia 2022     Enlarged prostate     per pt reprt     Hypertension      Kidney stone      Other constipation 2023     Prostate cancer (H) 2022     PSA elevation       Past Surgical History:   Procedure Laterality Date     BIOPSY  12    Prostate - High PSA - Benign     COLONOSCOPY  2013    Dr. Craig [facility]     COLONOSCOPY  2013    Procedure: COMBINED COLONOSCOPY, SINGLE BIOPSY/POLYPECTOMY BY BIOPSY;  Colonoscopy with polypectomy using cold forceps.;  Surgeon: Zach Craig MD;  Location:  GI      Allergies   Allergen Reactions     No Known Drug Allergy       Social History     Tobacco Use     Smoking status: Never     Smokeless tobacco: Never   Vaping Use     Vaping status: Never Used   Substance Use Topics     Alcohol use: Not Currently     Comment: Social drinking once in a while.      Wt Readings from Last 1 Encounters:   23 57.1 kg (125 lb 14.4 oz)        Anesthesia Evaluation            ROS/MED HX  ENT/Pulmonary:  - neg pulmonary ROS     Neurologic:  - neg neurologic ROS     Cardiovascular:     (+) hypertension-----    METS/Exercise Tolerance:     Hematologic:       Musculoskeletal:       GI/Hepatic:       Renal/Genitourinary:     (+) Nephrolithiasis ,     Endo:  - neg endo ROS     Psychiatric/Substance Use:     (+) psychiatric history anxiety     Infectious Disease:  - neg infectious disease ROS     Malignancy:   (+) Malignancy, History of Prostate.    Other:            Physical Exam    Airway        Mallampati: III   TM distance: > 3 FB   Neck ROM: full   Mouth opening: > 3 cm    Respiratory Devices and Support         Dental       (+) Modest Abnormalities - crowns, retainers,  1 or 2 missing teeth      Cardiovascular   cardiovascular exam normal          Pulmonary   pulmonary exam normal                OUTSIDE LABS:  CBC:   Lab Results   Component Value Date    WBC 7.7 05/09/2023    WBC 9.4 01/06/2023    HGB 13.1 (L) 05/09/2023    HGB 12.6 (L) 01/06/2023    HCT 39.7 (L) 05/09/2023    HCT 39.4 (L) 01/06/2023     05/09/2023     01/06/2023     BMP:   Lab Results   Component Value Date     05/09/2023     01/06/2023    POTASSIUM 4.3 05/09/2023    POTASSIUM 3.7 01/06/2023    CHLORIDE 102 05/09/2023    CHLORIDE 102 01/06/2023    CO2 28 05/09/2023    CO2 29 01/06/2023    BUN 12.8 05/09/2023    BUN 17.2 01/06/2023    CR 0.76 05/09/2023    CR 0.60 (L) 01/06/2023     (H) 05/09/2023     (H) 01/06/2023     COAGS: No results found for: PTT, INR, FIBR  POC: No results found for: BGM, HCG, HCGS  HEPATIC:   Lab Results   Component Value Date    ALBUMIN 4.5 01/06/2023    PROTTOTAL 7.4 01/06/2023    ALT 72 (H) 01/06/2023     (H) 01/06/2023    ALKPHOS 93 01/06/2023    BILITOTAL 0.7 01/06/2023     OTHER:   Lab Results   Component Value Date    A1C 5.3 05/23/2023    JULIAN 9.4 05/09/2023    TSH 2.04 05/23/2023       Anesthesia Plan    ASA Status:  3      Anesthesia Type: General.     - Airway: LMA              Consents    Anesthesia Plan(s) and associated risks, benefits, and realistic alternatives discussed. Questions answered and patient/representative(s) expressed understanding.    - Discussed:     - Discussed with:  Patient      - Extended Intubation/Ventilatory Support Discussed: No.      - Patient is DNR/DNI Status: No    Use of blood products discussed: No .     Postoperative Care    Pain management: IV analgesics, Oral pain medications, Multi-modal analgesia.   PONV prophylaxis: Ondansetron (or other 5HT-3), Dexamethasone or Solumedrol     Comments:                King Nielson MD

## 2023-06-02 NOTE — PROGRESS NOTES
1650:  Dr Hernandez to the bedside to reassess the CBI.  Per Dr Hernandez; he's ok with the color and to keep traction going until his team evaluates again in the morning.

## 2023-06-03 LAB
ANION GAP SERPL CALCULATED.3IONS-SCNC: 9 MMOL/L (ref 7–15)
BUN SERPL-MCNC: 21.5 MG/DL (ref 8–23)
CALCIUM SERPL-MCNC: 8.7 MG/DL (ref 8.8–10.2)
CHLORIDE SERPL-SCNC: 100 MMOL/L (ref 98–107)
CREAT SERPL-MCNC: 0.68 MG/DL (ref 0.67–1.17)
DEPRECATED HCO3 PLAS-SCNC: 25 MMOL/L (ref 22–29)
ERYTHROCYTE [DISTWIDTH] IN BLOOD BY AUTOMATED COUNT: 16.3 % (ref 10–15)
GFR SERPL CREATININE-BSD FRML MDRD: >90 ML/MIN/1.73M2
GLUCOSE SERPL-MCNC: 141 MG/DL (ref 70–99)
HCT VFR BLD AUTO: 30.5 % (ref 40–53)
HGB BLD-MCNC: 10 G/DL (ref 13.3–17.7)
MCH RBC QN AUTO: 27.2 PG (ref 26.5–33)
MCHC RBC AUTO-ENTMCNC: 32.8 G/DL (ref 31.5–36.5)
MCV RBC AUTO: 83 FL (ref 78–100)
PLATELET # BLD AUTO: 225 10E3/UL (ref 150–450)
POTASSIUM SERPL-SCNC: 4.5 MMOL/L (ref 3.4–5.3)
RBC # BLD AUTO: 3.68 10E6/UL (ref 4.4–5.9)
SODIUM SERPL-SCNC: 134 MMOL/L (ref 136–145)
WBC # BLD AUTO: 16.7 10E3/UL (ref 4–11)

## 2023-06-03 PROCEDURE — 80048 BASIC METABOLIC PNL TOTAL CA: CPT | Performed by: UROLOGY

## 2023-06-03 PROCEDURE — 258N000003 HC RX IP 258 OP 636: Performed by: UROLOGY

## 2023-06-03 PROCEDURE — 85027 COMPLETE CBC AUTOMATED: CPT | Performed by: UROLOGY

## 2023-06-03 PROCEDURE — 258N000001 HC RX 258: Performed by: UROLOGY

## 2023-06-03 PROCEDURE — 36415 COLL VENOUS BLD VENIPUNCTURE: CPT | Performed by: UROLOGY

## 2023-06-03 RX ADMIN — SODIUM CHLORIDE 3000 ML: 900 IRRIGANT IRRIGATION at 10:14

## 2023-06-03 RX ADMIN — SODIUM CHLORIDE 6000 ML: 900 IRRIGANT IRRIGATION at 00:39

## 2023-06-03 RX ADMIN — SODIUM CHLORIDE 6000 ML: 900 IRRIGANT IRRIGATION at 14:12

## 2023-06-03 RX ADMIN — SODIUM CHLORIDE 6000 ML: 900 IRRIGANT IRRIGATION at 07:37

## 2023-06-03 RX ADMIN — SODIUM CHLORIDE 3000 ML: 900 IRRIGANT IRRIGATION at 06:01

## 2023-06-03 RX ADMIN — POTASSIUM CHLORIDE, DEXTROSE MONOHYDRATE AND SODIUM CHLORIDE: 150; 5; 450 INJECTION, SOLUTION INTRAVENOUS at 11:27

## 2023-06-03 RX ADMIN — SODIUM CHLORIDE 3000 ML: 900 IRRIGANT IRRIGATION at 11:23

## 2023-06-03 RX ADMIN — SODIUM CHLORIDE 3000 ML: 900 IRRIGANT IRRIGATION at 05:12

## 2023-06-03 ASSESSMENT — ACTIVITIES OF DAILY LIVING (ADL)
ADLS_ACUITY_SCORE: 27
ADLS_ACUITY_SCORE: 25
ADLS_ACUITY_SCORE: 27
ADLS_ACUITY_SCORE: 25
ADLS_ACUITY_SCORE: 27
ADLS_ACUITY_SCORE: 25
ADLS_ACUITY_SCORE: 27
ADLS_ACUITY_SCORE: 27

## 2023-06-03 NOTE — PLAN OF CARE
"PRIMARY DIAGNOSIS: TURP  OUTPATIENT/OBSERVATION GOALS TO BE MET BEFORE DISCHARGE:  1. ADLs back to baseline: Yes    2. Activity and level of assistance: Up with standby assistance.    3. Pain status: Pain free.Pt describes burning in urethra.     4. Return to near baseline physical activity: Yes     Discharge Planner Nurse   Safe discharge environment identified: Yes  Barriers to discharge: Yes       Entered by: Anitha Almanzar RN 06/03/2023 1:05 AM      BP 94/61 (BP Location: Right arm)   Pulse 56   Temp 97.9  F (36.6  C) (Oral)   Resp 18   Ht 1.702 m (5' 7\")   Wt 57.1 kg (125 lb 14.4 oz)   SpO2 98%   BMI 19.72 kg/m     A&OX4, VSS, LS clear all lobes, Apical sound regular, Bowel sounds active all quadrants. CBI with light pink color urine noted in tubing. Pt c/o mild intermittent burning in urethra. Denied PRN meds. Traction in place, Urology will assess tomorrow. Continuous IVF D51/2NS with 20mEq K @ 100ml/hr Regular diet. Will continue to monitor and provide cares.   Please review provider order for any additional goals.   Nurse to notify provider when observation goals have been met and patient is ready for discharge.    "

## 2023-06-03 NOTE — PLAN OF CARE
Assessments:  A/O x4, VSS. On capno, no SoB with room air. On CBI with occassional clots, latest output was light red to light pink. Traction in place, to be seen by Urology team tomorrow. No n/v. Tolerated regular diet.     Major Shift Events: admitted to floor, s/p TURP    Treatment Plan: CBI, monitor for bleeding     Bedside Nurse: Torey Gaona RN

## 2023-06-03 NOTE — PROGRESS NOTES
Urology Progress Note    S/p TURP yesterday  Doing well, no pain, urine clear off CBI this AM, but on traction w/ 50 in balloon    Vitals stable and WNL  Urine clear w/ no CBI  Belly soft, NT, ND    A/P  POD#1 s/p TURP    - 30 mL out of balloon, ambulate  - If clear, voiding trial (fill and pull)  - Discharge if voiding    Ash Holliday MD  Urology PGY-5

## 2023-06-04 VITALS
DIASTOLIC BLOOD PRESSURE: 62 MMHG | BODY MASS INDEX: 19.76 KG/M2 | SYSTOLIC BLOOD PRESSURE: 109 MMHG | TEMPERATURE: 97.8 F | OXYGEN SATURATION: 96 % | RESPIRATION RATE: 20 BRPM | HEIGHT: 67 IN | WEIGHT: 125.9 LBS | HEART RATE: 65 BPM

## 2023-06-04 LAB — GLUCOSE BLDC GLUCOMTR-MCNC: 98 MG/DL (ref 70–99)

## 2023-06-04 PROCEDURE — 250N000013 HC RX MED GY IP 250 OP 250 PS 637: Performed by: UROLOGY

## 2023-06-04 PROCEDURE — 82962 GLUCOSE BLOOD TEST: CPT

## 2023-06-04 RX ADMIN — AMLODIPINE BESYLATE 5 MG: 5 TABLET ORAL at 09:03

## 2023-06-04 RX ADMIN — LISINOPRIL 20 MG: 20 TABLET ORAL at 09:03

## 2023-06-04 ASSESSMENT — ACTIVITIES OF DAILY LIVING (ADL)
ADLS_ACUITY_SCORE: 25

## 2023-06-04 NOTE — PLAN OF CARE
Pertinent assessments: POD #2 from TURP with Urology. VSS on room air. Afebrile. A&Ox4. Up independently. Tolerating a regular diet. Denies pain. Raygoza discontinued yesterday at 1530. Has been voiding.     Major Shift Events: Urine red through out the night. In the morning, clots noted with urine.     Treatment Plan: Urology following. Discharge home today.

## 2023-06-04 NOTE — PLAN OF CARE
VSS on RA. Pt. denies pain. IV removed. Discharge instructions went over with pt. at bedside.  Restrictions were communicated with pt. and adverse signs to look out for concerning his surgery were discussed. All questions were answered. Pt. home medications receipt given to pt. and he was taken to inpatient main pharmacy. All questions were answered.Pt. discharging home with friend as ride. No new concerns.

## 2023-06-04 NOTE — PROVIDER NOTIFICATION
Paged Urology (Dr. Holliday) provider for PVR of 432 after void of 150 mL of red colored urine.     Will place 22 fr, 3 way nath if patient becomes uncomfortable, cannot void, or has PVR >500 after void.

## 2023-06-04 NOTE — PROGRESS NOTES
Urology Progress Note    Kept overnight following voiding trial as he was passing clots and PVRs >400.  Catheter not replaced as he has been voiding much better since passing large clots last evening.  PVRs this AM ~200, last few voids yellow per pt    AVSS on RA  Urine in urinal maroon colored, aggregate of last several voids    A/P  POD#2 s/p TURP, TOV yesterday, voiding well today with permissible PVRs    - Discharge this AM    Seen with staff Dr. Brandt.      Ash Holliday MD  Urology PGY-5

## 2023-06-04 NOTE — PLAN OF CARE
Pertinent assessments: POD #1 from TURP with Urology. VSS on room air. Afebrile. A&Ox4. Up independently. Tolerating a regular diet.    Major Shift Events: Raygoza discontinued approximately 1530. Met due to void. Last PVR assessed at 325. Continue to monitor urine output.     Treatment Plan: Urology following. Discharge home on Sun 6/4.    Bedside Nurse: Jasiel Soriano RN     PAIN/NAUSEA/VOMITING

## 2023-06-05 LAB
PATH REPORT.COMMENTS IMP SPEC: NORMAL
PATH REPORT.COMMENTS IMP SPEC: NORMAL
PATH REPORT.FINAL DX SPEC: NORMAL
PATH REPORT.GROSS SPEC: NORMAL
PATH REPORT.MICROSCOPIC SPEC OTHER STN: NORMAL
PATH REPORT.RELEVANT HX SPEC: NORMAL
PHOTO IMAGE: NORMAL

## 2023-06-05 PROCEDURE — 88305 TISSUE EXAM BY PATHOLOGIST: CPT | Mod: 26 | Performed by: PATHOLOGY

## 2023-08-17 ENCOUNTER — TRANSFERRED RECORDS (OUTPATIENT)
Dept: FAMILY MEDICINE | Facility: CLINIC | Age: 78
End: 2023-08-17

## 2023-09-05 ENCOUNTER — TELEPHONE (OUTPATIENT)
Dept: FAMILY MEDICINE | Facility: CLINIC | Age: 78
End: 2023-09-05
Payer: MEDICARE

## 2023-09-05 NOTE — TELEPHONE ENCOUNTER
Patient Quality Outreach    Patient is due for the following:   Diabetes -  Eye Exam    Next Steps:   No follow up needed at this time.    Type of outreach:    Chart review performed, no outreach needed. and patient was seen at [facility] on 08-      Questions for provider review:    None           Corie Hallman MA

## 2023-09-18 ENCOUNTER — OFFICE VISIT (OUTPATIENT)
Dept: UROLOGY | Facility: CLINIC | Age: 78
End: 2023-09-18
Payer: MEDICARE

## 2023-09-18 VITALS
BODY MASS INDEX: 20.25 KG/M2 | DIASTOLIC BLOOD PRESSURE: 85 MMHG | HEIGHT: 67 IN | SYSTOLIC BLOOD PRESSURE: 154 MMHG | WEIGHT: 129 LBS | HEART RATE: 65 BPM

## 2023-09-18 DIAGNOSIS — N40.0 ENLARGED PROSTATE: ICD-10-CM

## 2023-09-18 DIAGNOSIS — R97.20 ELEVATED PROSTATE SPECIFIC ANTIGEN (PSA): Primary | ICD-10-CM

## 2023-09-18 LAB
ALBUMIN UR-MCNC: ABNORMAL MG/DL
APPEARANCE UR: CLEAR
BILIRUB UR QL STRIP: NEGATIVE
COLOR UR AUTO: YELLOW
GLUCOSE UR STRIP-MCNC: NEGATIVE MG/DL
HGB UR QL STRIP: ABNORMAL
KETONES UR STRIP-MCNC: NEGATIVE MG/DL
LEUKOCYTE ESTERASE UR QL STRIP: ABNORMAL
NITRATE UR QL: NEGATIVE
PH UR STRIP: 7 [PH] (ref 5–7)
RESIDUAL VOLUME (RV) (EXTERNAL): 4
SP GR UR STRIP: 1.02 (ref 1–1.03)
UROBILINOGEN UR STRIP-ACNC: 0.2 E.U./DL

## 2023-09-18 PROCEDURE — 81003 URINALYSIS AUTO W/O SCOPE: CPT | Mod: QW | Performed by: UROLOGY

## 2023-09-18 PROCEDURE — 99213 OFFICE O/P EST LOW 20 MIN: CPT | Mod: 25 | Performed by: UROLOGY

## 2023-09-18 PROCEDURE — 51798 US URINE CAPACITY MEASURE: CPT | Performed by: UROLOGY

## 2023-09-18 ASSESSMENT — PAIN SCALES - GENERAL: PAINLEVEL: NO PAIN (0)

## 2023-09-18 NOTE — LETTER
9/18/2023       RE: Paul Smith  [address] [address]     Dear Colleague,    Thank you for referring your patient, Paul Smith, to the [facility] [place] at [facility]. Please see a copy of my visit note below.    Office Visit Note  [facility]  [phone]    UROLOGIC DIAGNOSES:   Enlarged prostate  History of retention  Elevated PSA    CURRENT INTERVENTIONS:   TURP in June  Prior self-catheterization  MRI prostate 2015  Negative prostate biopsy x2    HISTORY:   Chris underwent TURP in the operating room in June and he did well afterwards.  His pathology showed benign prostate tissue.  His Raygoza catheter was removed prior to discharge from the hospital.  He reports voiding very well since the procedure.  He has not had to catheterize since the procedure.  He has no complaints at this time.      PAST MEDICAL HISTORY:   Past Medical History:   Diagnosis Date    Anxiety 03/23/2022    Benign prostatic hyperplasia with nocturia 03/23/2022    Enlarged prostate     per pt reprt    Hypertension     Kidney stone 1990    Other constipation 05/23/2023    Prostate cancer (H) 03/23/2022    PSA elevation        PAST SURGICAL HISTORY:   Past Surgical History:   Procedure Laterality Date    BIOPSY  06/12/12    Prostate - High PSA - Benign    COLONOSCOPY  7/24/2013    Dr. Craig [facility]    COLONOSCOPY  7/24/2013    Procedure: COMBINED COLONOSCOPY, SINGLE BIOPSY/POLYPECTOMY BY BIOPSY;  Colonoscopy with polypectomy using cold forceps.;  Surgeon: Zach Craig MD;  Location:  GI    CYSTOSCOPY, TRANSURETHRAL RESECTION (TUR) PROSTATE, COMBINED N/A 6/2/2023    Procedure: Cystoscopy, transurethral resection of the prostate with the bipolar electrode;  Surgeon: Chaparro Patterson MD;  Location:  OR       FAMILY HISTORY:   Family History   Problem Relation Age of Onset    Hypertension Mother         Dementia    Alzheimer  Disease Mother         dementia    Cancer Father 64        lymphoma cancer    Heart Disease Father         triple by-pass    Other Cancer Father         Lymphoma       SOCIAL HISTORY:   Social History     Socioeconomic History    Marital status: Single    Highest education level: Some college, no degree   Tobacco Use    Smoking status: Never    Smokeless tobacco: Never   Vaping Use    Vaping Use: Never used   Substance and Sexual Activity    Alcohol use: Not Currently     Comment: Social drinking once in a while.    Drug use: No    Sexual activity: Never   Other Topics Concern    Parent/sibling w/ CABG, MI or angioplasty before 65F 55M? No     Social Determinants of Health     Financial Resource Strain: Low Risk  (5/6/2023)    Overall Financial Resource Strain (CARDIA)     Difficulty of Paying Living Expenses: Not hard at all   Food Insecurity: No Food Insecurity (5/6/2023)    Hunger Vital Sign     Worried About Running Out of Food in the Last Year: Never true     Ran Out of Food in the Last Year: Never true   Transportation Needs: No Transportation Needs (5/6/2023)    PRAPARE - Transportation     Lack of Transportation (Medical): No     Lack of Transportation (Non-Medical): No   Physical Activity: Sufficiently Active (5/6/2023)    Exercise Vital Sign     Days of Exercise per Week: 7 days     Minutes of Exercise per Session: 30 min   Stress: No Stress Concern Present (5/6/2023)    [employer] [place] [employer] - Occupational Stress Questionnaire     Feeling of Stress : Not at all   Social Connections: Socially Isolated (5/6/2023)    Social Connection and Isolation Panel [NHANES]     Frequency of Communication with Friends and Family: Once a week     Frequency of Social Gatherings with Friends and Family: Once a week     Attends Scient[employer] Services: 1 to 4 times per year     Active Member of Clubs or Organizations: No     Marital Status: Never    Housing Stability: Low Risk  (5/6/2023)    Housing  Stability Vital Sign     Unable to Pay for Housing in the Last Year: No     Number of Places Lived in the Last Year: 1     Unstable Housing in the Last Year: No       Review Of Systems:  Skin: No rash, pruritis, or skin pigmentation  Eyes: No changes in vision  Ears/Nose/Throat: No changes in hearing, no nosebleeds  Respiratory: No shortness of breath, dyspnea on exertion, cough, or hemoptysis  Cardiovascular: No chest pain or palpitations  Gastrointestinal: No diarrhea or constipation. No abdominal pain. No hematochezia  Genitourinary: see HPI  Musculoskeletal: No pain or swelling of joints, normal range of motion  Neurologic: No weakness or tremors  Psychiatric: No recent changes in memory or mood  Hematologic/Lymphatic/Immunologic: No easy bruising or enlarged lymph nodes  Endocrine: No weight gain or loss      PHYSICAL EXAM:    There were no vitals taken for this visit.    Constitutional: Well developed. Conversant and in no acute distress  Eyes: Anicteric sclera, conjunctiva clear, normal extraocular movements  ENT: Normocephalic and atraumatic,   Skin: Warm and dry. No rashes or lesions  Cardiac: No peripheral edema  Back/Flank: Not done  CNS/PNS: Normal musculature and movements, moves all extremities normally  Respiratory: Normal non-labored breathing  Abdomen: Soft nontender and nondistended  Peripheral Vascular: No peripheral edema  Mental Status/Psych: Alert and Oriented x 3. Normal mood and affect    Penis: Not done  Scrotal Skin: Not done  Testicles: Not done  Epididymis: Not done  Digital Rectal Exam:     Cystoscopy: Not done    Imaging: None    Urinalysis: UA RESULTS:  Recent Labs   Lab Test 05/24/23  0917 02/06/23  0800 01/06/23  1439   COLOR Yellow   < > Light Yellow   APPEARANCE Clear   < > Slightly Cloudy*   URINEGLC Negative   < > Negative   URINEBILI Negative   < > Negative   URINEKETONE Negative   < > Negative   SG 1.015   < > 1.009   UBLD Trace*   < > Moderate*   URINEPH 7.0   < > 8.0*    PROTEIN Negative   < > 10*   UROBILINOGEN 0.2   < >  --    NITRITE Negative   < > Negative   LEUKEST Small*   < > Negative   RBCU  --   --  37*   WBCU  --   --  6*    < > = values in this interval not displayed.       PSA: 8.49 prior to TURP    Post Void Residual: 4mL    Other labs: None today      IMPRESSION:  Doing well    PLAN:  He is doing very well after TURP.  He is emptying his bladder well.  I recommend I see him again in 1 year, at that time if he is still stable he can likely be discharged from urology clinic.      Chaparro Patterson M.D.

## 2023-09-18 NOTE — PROGRESS NOTES
Office Visit Note  [facility]  [phone]    UROLOGIC DIAGNOSES:   Enlarged prostate  History of retention  Elevated PSA    CURRENT INTERVENTIONS:   TURP in June  Prior self-catheterization  MRI prostate 2015  Negative prostate biopsy x2    HISTORY:   Chris underwent TURP in the operating room in June and he did well afterwards.  His pathology showed benign prostate tissue.  His Raygoza catheter was removed prior to discharge from the hospital.  He reports voiding very well since the procedure.  He has not had to catheterize since the procedure.  He has no complaints at this time.      PAST MEDICAL HISTORY:   Past Medical History:   Diagnosis Date    Anxiety 03/23/2022    Benign prostatic hyperplasia with nocturia 03/23/2022    Enlarged prostate     per pt reprt    Hypertension     Kidney stone 1990    Other constipation 05/23/2023    Prostate cancer (H) 03/23/2022    PSA elevation        PAST SURGICAL HISTORY:   Past Surgical History:   Procedure Laterality Date    BIOPSY  06/12/12    Prostate - High PSA - Benign    COLONOSCOPY  7/24/2013    Dr. Craig [facility]    COLONOSCOPY  7/24/2013    Procedure: COMBINED COLONOSCOPY, SINGLE BIOPSY/POLYPECTOMY BY BIOPSY;  Colonoscopy with polypectomy using cold forceps.;  Surgeon: Zach Craig MD;  Location:  GI    CYSTOSCOPY, TRANSURETHRAL RESECTION (TUR) PROSTATE, COMBINED N/A 6/2/2023    Procedure: Cystoscopy, transurethral resection of the prostate with the bipolar electrode;  Surgeon: Chaparro Patterson MD;  Location:  OR       FAMILY HISTORY:   Family History   Problem Relation Age of Onset    Hypertension Mother         Dementia    Alzheimer Disease Mother         dementia    Cancer Father 64        lymphoma cancer    Heart Disease Father         triple by-pass    Other Cancer Father         Lymphoma       SOCIAL HISTORY:   Social History     Socioeconomic History    Marital status: Single    Highest education level: Some college, no degree   Tobacco  Use    Smoking status: Never    Smokeless tobacco: Never   Vaping Use    Vaping Use: Never used   Substance and Sexual Activity    Alcohol use: Not Currently     Comment: Social drinking once in a while.    Drug use: No    Sexual activity: Never   Other Topics Concern    Parent/sibling w/ CABG, MI or angioplasty before 65F 55M? No     Social Determinants of Health     Financial Resource Strain: Low Risk  (5/6/2023)    Overall Financial Resource Strain (CARDIA)     Difficulty of Paying Living Expenses: Not hard at all   Food Insecurity: No Food Insecurity (5/6/2023)    Hunger Vital Sign     Worried About Running Out of Food in the Last Year: Never true     Ran Out of Food in the Last Year: Never true   Transportation Needs: No Transportation Needs (5/6/2023)    PRAPARE - Transportation     Lack of Transportation (Medical): No     Lack of Transportation (Non-Medical): No   Physical Activity: Sufficiently Active (5/6/2023)    Exercise Vital Sign     Days of Exercise per Week: 7 days     Minutes of Exercise per Session: 30 min   Stress: No Stress Concern Present (5/6/2023)    [employer] [employer]ational [employer] - Occupational Stress Questionnaire     Feeling of Stress : Not at all   Social Connections: Socially Isolated (5/6/2023)    Social Connection and Isolation Panel [NHANES]     Frequency of Communication with Friends and Family: Once a week     Frequency of Social Gatherings with Friends and Family: Once a week     Attends Mandaen Services: 1 to 4 times per year     Active Member of Clubs or Organizations: No     Marital Status: Never    Housing Stability: Low Risk  (5/6/2023)    Housing Stability Vital Sign     Unable to Pay for Housing in the Last Year: No     Number of Places Lived in the Last Year: 1     Unstable Housing in the Last Year: No       Review Of Systems:  Skin: No rash, pruritis, or skin pigmentation  Eyes: No changes in vision  Ears/Nose/Throat: No changes in hearing, no  nosebleeds  Respiratory: No shortness of breath, dyspnea on exertion, cough, or hemoptysis  Cardiovascular: No chest pain or palpitations  Gastrointestinal: No diarrhea or constipation. No abdominal pain. No hematochezia  Genitourinary: see HPI  Musculoskeletal: No pain or swelling of joints, normal range of motion  Neurologic: No weakness or tremors  Psychiatric: No recent changes in memory or mood  Hematologic/Lymphatic/Immunologic: No easy bruising or enlarged lymph nodes  Endocrine: No weight gain or loss      PHYSICAL EXAM:    There were no vitals taken for this visit.    Constitutional: Well developed. Conversant and in no acute distress  Eyes: Anicteric sclera, conjunctiva clear, normal extraocular movements  ENT: Normocephalic and atraumatic,   Skin: Warm and dry. No rashes or lesions  Cardiac: No peripheral edema  Back/Flank: Not done  CNS/PNS: Normal musculature and movements, moves all extremities normally  Respiratory: Normal non-labored breathing  Abdomen: Soft nontender and nondistended  Peripheral Vascular: No peripheral edema  Mental Status/Psych: Alert and Oriented x 3. Normal mood and affect    Penis: Not done  Scrotal Skin: Not done  Testicles: Not done  Epididymis: Not done  Digital Rectal Exam:     Cystoscopy: Not done    Imaging: None    Urinalysis: UA RESULTS:  Recent Labs   Lab Test 05/24/23  0917 02/06/23  0800 01/06/23  1439   COLOR Yellow   < > Light Yellow   APPEARANCE Clear   < > Slightly Cloudy*   URINEGLC Negative   < > Negative   URINEBILI Negative   < > Negative   URINEKETONE Negative   < > Negative   SG 1.015   < > 1.009   UBLD Trace*   < > Moderate*   URINEPH 7.0   < > 8.0*   PROTEIN Negative   < > 10*   UROBILINOGEN 0.2   < >  --    NITRITE Negative   < > Negative   LEUKEST Small*   < > Negative   RBCU  --   --  37*   WBCU  --   --  6*    < > = values in this interval not displayed.       PSA: 8.49 prior to TURP    Post Void Residual: 4mL    Other labs: None  today      IMPRESSION:  Doing well    PLAN:  He is doing very well after TURP.  He is emptying his bladder well.  I recommend I see him again in 1 year, at that time if he is still stable he can likely be discharged from urology clinic.      Chaparro Patterson M.D.

## 2023-09-18 NOTE — NURSING NOTE
Chief Complaint   Patient presents with    Elevated PSA    Benign Prostatic Hypertrophy     PVR: 4 ML by bladder scan    Julia Hartman, EMT

## 2024-01-12 NOTE — PLAN OF CARE
Assessments:  A/O x4, VSS. On RA, Capno removed. Pt. ambulating with no pain of CEE. PT. Denies pain. On CBI with output light pink. Traction changed to statlock. 30 mL out of balloon, total of 20 ml left. Tolerated regular diet.     Treatment Plan: fill and pull nath, possible discharge if voiding after removal. Monitor for bleeding       Bedside Nurse: Мария Puga RN     Patient was left a message to call the office.  Inform the patient that we received notice that his blood pressure was elevated at his Pulmonology appt today.  Dr Garner would like the patient to monitor his blood pressures a couple times a week for 4 weeks and if the blood pressure remains over 140/90 then he will need to be seen in the office.

## 2024-04-23 ENCOUNTER — PATIENT OUTREACH (OUTPATIENT)
Dept: CARE COORDINATION | Facility: CLINIC | Age: 79
End: 2024-04-23
Payer: MEDICARE

## 2024-05-07 ENCOUNTER — PATIENT OUTREACH (OUTPATIENT)
Dept: CARE COORDINATION | Facility: CLINIC | Age: 79
End: 2024-05-07
Payer: MEDICARE

## 2024-08-04 ENCOUNTER — HEALTH MAINTENANCE LETTER (OUTPATIENT)
Age: 79
End: 2024-08-04

## 2024-08-13 ENCOUNTER — PATIENT OUTREACH (OUTPATIENT)
Dept: FAMILY MEDICINE | Facility: CLINIC | Age: 79
End: 2024-08-13
Payer: MEDICARE

## 2024-08-13 NOTE — TELEPHONE ENCOUNTER
Patient Quality Outreach    Patient is due for the following:   Physical Annual Wellness Visit    Next Steps:   Schedule a Annual Wellness Visit    Type of outreach:    Sent letter.      Questions for provider review:    None           Arelis Motta

## 2024-08-13 NOTE — LETTER
August 13, 2024      Paul Smith  [address] [address]        Dear Paul,       Here at [facility], we care about your health. In a recent review of your records, we show that you are due for the following Health Maintenance Items:    Annual Wellness Visit     Please call Central Scheduling to schedule a visit with your provider @ [phone].    Thank-you,  [facility] Care Team

## 2024-09-19 ENCOUNTER — TRANSFERRED RECORDS (OUTPATIENT)
Dept: HEALTH INFORMATION MANAGEMENT | Facility: CLINIC | Age: 79
End: 2024-09-19
Payer: MEDICARE

## 2024-09-23 ENCOUNTER — TELEPHONE (OUTPATIENT)
Dept: FAMILY MEDICINE | Facility: CLINIC | Age: 79
End: 2024-09-23
Payer: MEDICARE

## 2024-09-23 NOTE — TELEPHONE ENCOUNTER
Patient Quality Outreach    Patient is due for the following:   Diabetes -  Eye Exam    Next Steps:   [place] [facility] Retinopathy right eye     Type of outreach:    Chart review performed, no outreach needed. and patient has been seen with [facility] 09-      Questions for provider review:    None           Corie Hallman MA

## 2024-11-19 ENCOUNTER — PATIENT OUTREACH (OUTPATIENT)
Dept: CARE COORDINATION | Facility: CLINIC | Age: 79
End: 2024-11-19
Payer: MEDICARE

## 2025-04-12 ENCOUNTER — HEALTH MAINTENANCE LETTER (OUTPATIENT)
Age: 80
End: 2025-04-12

## 2025-08-16 ENCOUNTER — HEALTH MAINTENANCE LETTER (OUTPATIENT)
Age: 80
End: 2025-08-16

## (undated) DEVICE — BAG URINARY DRAIN 4000ML LF 153509

## (undated) DEVICE — EVACUATOR BLADDER UROVAC LATEX M0067301250

## (undated) DEVICE — GLOVE BIOGEL PI ULTRATOUCH SZ 7.5 41175

## (undated) DEVICE — Device

## (undated) DEVICE — PAD CHUX UNDERPAD 30X36" P3036C

## (undated) DEVICE — ESU ELEC RESECTOSCOPIC PLASMALOOP 24FR LG 12/16DEG WA22703S

## (undated) DEVICE — LINEN HALF SHEET 5512

## (undated) DEVICE — SOL NACL 0.9% IRRIG 1000ML BOTTLE 2F7124

## (undated) DEVICE — LINEN FULL SHEET 5511

## (undated) DEVICE — BAG CLEAR TRASH 1.3M 39X33" P4040C

## (undated) DEVICE — COVER FOOTSWITCH W/CINCH 20X24" 923267

## (undated) DEVICE — BASIN SET MINOR DISP

## (undated) DEVICE — SYR 50ML CATH TIP W/O NDL 309620

## (undated) DEVICE — PACK CYSTO CUSTOM RIDGES

## (undated) DEVICE — SOL NACL 0.9% IRRIG 3000ML BAG 2B7477

## (undated) DEVICE — TUBING SET IRRIGATION 4 LEAD 90" DYND19124

## (undated) RX ORDER — EPHEDRINE SULFATE 50 MG/ML
INJECTION, SOLUTION INTRAMUSCULAR; INTRAVENOUS; SUBCUTANEOUS
Status: DISPENSED
Start: 2023-06-02

## (undated) RX ORDER — CEFAZOLIN SODIUM/WATER 2 G/20 ML
SYRINGE (ML) INTRAVENOUS
Status: DISPENSED
Start: 2023-06-02